# Patient Record
Sex: FEMALE | Race: WHITE | NOT HISPANIC OR LATINO | URBAN - METROPOLITAN AREA
[De-identification: names, ages, dates, MRNs, and addresses within clinical notes are randomized per-mention and may not be internally consistent; named-entity substitution may affect disease eponyms.]

---

## 2018-07-26 ENCOUNTER — TRANSCRIBE ORDERS (OUTPATIENT)
Dept: LAB | Facility: CLINIC | Age: 83
End: 2018-07-26

## 2018-07-26 ENCOUNTER — APPOINTMENT (OUTPATIENT)
Dept: LAB | Facility: CLINIC | Age: 83
End: 2018-07-26
Payer: COMMERCIAL

## 2018-07-26 DIAGNOSIS — I10 ESSENTIAL HYPERTENSION, MALIGNANT: Primary | ICD-10-CM

## 2018-07-26 DIAGNOSIS — I10 ESSENTIAL HYPERTENSION, MALIGNANT: ICD-10-CM

## 2018-07-26 LAB
ALBUMIN SERPL BCP-MCNC: 4.1 G/DL (ref 3.5–5)
ALP SERPL-CCNC: 91 U/L (ref 46–116)
ALT SERPL W P-5'-P-CCNC: 21 U/L (ref 12–78)
ANION GAP SERPL CALCULATED.3IONS-SCNC: 6 MMOL/L (ref 4–13)
AST SERPL W P-5'-P-CCNC: 21 U/L (ref 5–45)
BASOPHILS # BLD AUTO: 0.07 THOUSANDS/ΜL (ref 0–0.1)
BASOPHILS NFR BLD AUTO: 1 % (ref 0–1)
BILIRUB SERPL-MCNC: 0.71 MG/DL (ref 0.2–1)
BUN SERPL-MCNC: 32 MG/DL (ref 5–25)
CALCIUM SERPL-MCNC: 9.2 MG/DL (ref 8.3–10.1)
CHLORIDE SERPL-SCNC: 107 MMOL/L (ref 100–108)
CHOLEST SERPL-MCNC: 216 MG/DL (ref 50–200)
CO2 SERPL-SCNC: 25 MMOL/L (ref 21–32)
CREAT SERPL-MCNC: 1.27 MG/DL (ref 0.6–1.3)
EOSINOPHIL # BLD AUTO: 0.24 THOUSAND/ΜL (ref 0–0.61)
EOSINOPHIL NFR BLD AUTO: 3 % (ref 0–6)
ERYTHROCYTE [DISTWIDTH] IN BLOOD BY AUTOMATED COUNT: 12.5 % (ref 11.6–15.1)
GFR SERPL CREATININE-BSD FRML MDRD: 39 ML/MIN/1.73SQ M
GLUCOSE SERPL-MCNC: 96 MG/DL (ref 65–140)
HCT VFR BLD AUTO: 38.8 % (ref 34.8–46.1)
HDLC SERPL-MCNC: 48 MG/DL (ref 40–60)
HGB BLD-MCNC: 12.7 G/DL (ref 11.5–15.4)
IMM GRANULOCYTES # BLD AUTO: 0.02 THOUSAND/UL (ref 0–0.2)
IMM GRANULOCYTES NFR BLD AUTO: 0 % (ref 0–2)
LDLC SERPL CALC-MCNC: 137 MG/DL (ref 0–100)
LYMPHOCYTES # BLD AUTO: 1.4 THOUSANDS/ΜL (ref 0.6–4.47)
LYMPHOCYTES NFR BLD AUTO: 17 % (ref 14–44)
MCH RBC QN AUTO: 31.5 PG (ref 26.8–34.3)
MCHC RBC AUTO-ENTMCNC: 32.7 G/DL (ref 31.4–37.4)
MCV RBC AUTO: 96 FL (ref 82–98)
MONOCYTES # BLD AUTO: 0.86 THOUSAND/ΜL (ref 0.17–1.22)
MONOCYTES NFR BLD AUTO: 10 % (ref 4–12)
NEUTROPHILS # BLD AUTO: 5.76 THOUSANDS/ΜL (ref 1.85–7.62)
NEUTS SEG NFR BLD AUTO: 69 % (ref 43–75)
NONHDLC SERPL-MCNC: 168 MG/DL
NRBC BLD AUTO-RTO: 0 /100 WBCS
PLATELET # BLD AUTO: 247 THOUSANDS/UL (ref 149–390)
PMV BLD AUTO: 11.4 FL (ref 8.9–12.7)
POTASSIUM SERPL-SCNC: 4.1 MMOL/L (ref 3.5–5.3)
PROT SERPL-MCNC: 7.9 G/DL (ref 6.4–8.2)
RBC # BLD AUTO: 4.03 MILLION/UL (ref 3.81–5.12)
SODIUM SERPL-SCNC: 138 MMOL/L (ref 136–145)
TRIGL SERPL-MCNC: 155 MG/DL
WBC # BLD AUTO: 8.35 THOUSAND/UL (ref 4.31–10.16)

## 2018-07-26 PROCEDURE — 36415 COLL VENOUS BLD VENIPUNCTURE: CPT

## 2018-07-26 PROCEDURE — 80061 LIPID PANEL: CPT

## 2018-07-26 PROCEDURE — 85025 COMPLETE CBC W/AUTO DIFF WBC: CPT

## 2018-07-26 PROCEDURE — 80053 COMPREHEN METABOLIC PANEL: CPT

## 2018-10-22 ENCOUNTER — APPOINTMENT (OUTPATIENT)
Dept: LAB | Facility: CLINIC | Age: 83
End: 2018-10-22
Payer: COMMERCIAL

## 2018-10-22 ENCOUNTER — TRANSCRIBE ORDERS (OUTPATIENT)
Dept: LAB | Facility: CLINIC | Age: 83
End: 2018-10-22

## 2018-10-22 DIAGNOSIS — E78.00 PURE HYPERCHOLESTEROLEMIA: ICD-10-CM

## 2018-10-22 DIAGNOSIS — E78.00 PURE HYPERCHOLESTEROLEMIA: Primary | ICD-10-CM

## 2018-10-22 LAB
ALBUMIN SERPL BCP-MCNC: 4 G/DL (ref 3.5–5)
ALP SERPL-CCNC: 88 U/L (ref 46–116)
ALT SERPL W P-5'-P-CCNC: 21 U/L (ref 12–78)
ANION GAP SERPL CALCULATED.3IONS-SCNC: 5 MMOL/L (ref 4–13)
AST SERPL W P-5'-P-CCNC: 20 U/L (ref 5–45)
BASOPHILS # BLD AUTO: 0.08 THOUSANDS/ΜL (ref 0–0.1)
BASOPHILS NFR BLD AUTO: 1 % (ref 0–1)
BILIRUB SERPL-MCNC: 0.5 MG/DL (ref 0.2–1)
BUN SERPL-MCNC: 27 MG/DL (ref 5–25)
CALCIUM SERPL-MCNC: 9.6 MG/DL (ref 8.3–10.1)
CHLORIDE SERPL-SCNC: 107 MMOL/L (ref 100–108)
CHOLEST SERPL-MCNC: 219 MG/DL (ref 50–200)
CO2 SERPL-SCNC: 27 MMOL/L (ref 21–32)
CREAT SERPL-MCNC: 1.24 MG/DL (ref 0.6–1.3)
EOSINOPHIL # BLD AUTO: 0.48 THOUSAND/ΜL (ref 0–0.61)
EOSINOPHIL NFR BLD AUTO: 6 % (ref 0–6)
ERYTHROCYTE [DISTWIDTH] IN BLOOD BY AUTOMATED COUNT: 12.8 % (ref 11.6–15.1)
GFR SERPL CREATININE-BSD FRML MDRD: 40 ML/MIN/1.73SQ M
GLUCOSE P FAST SERPL-MCNC: 102 MG/DL (ref 65–99)
HCT VFR BLD AUTO: 37.9 % (ref 34.8–46.1)
HDLC SERPL-MCNC: 53 MG/DL (ref 40–60)
HGB BLD-MCNC: 12.1 G/DL (ref 11.5–15.4)
IMM GRANULOCYTES # BLD AUTO: 0.02 THOUSAND/UL (ref 0–0.2)
IMM GRANULOCYTES NFR BLD AUTO: 0 % (ref 0–2)
LDLC SERPL CALC-MCNC: 147 MG/DL (ref 0–100)
LYMPHOCYTES # BLD AUTO: 1.44 THOUSANDS/ΜL (ref 0.6–4.47)
LYMPHOCYTES NFR BLD AUTO: 19 % (ref 14–44)
MCH RBC QN AUTO: 31.7 PG (ref 26.8–34.3)
MCHC RBC AUTO-ENTMCNC: 31.9 G/DL (ref 31.4–37.4)
MCV RBC AUTO: 99 FL (ref 82–98)
MONOCYTES # BLD AUTO: 0.75 THOUSAND/ΜL (ref 0.17–1.22)
MONOCYTES NFR BLD AUTO: 10 % (ref 4–12)
NEUTROPHILS # BLD AUTO: 4.73 THOUSANDS/ΜL (ref 1.85–7.62)
NEUTS SEG NFR BLD AUTO: 64 % (ref 43–75)
NONHDLC SERPL-MCNC: 166 MG/DL
NRBC BLD AUTO-RTO: 0 /100 WBCS
PLATELET # BLD AUTO: 256 THOUSANDS/UL (ref 149–390)
PMV BLD AUTO: 10.8 FL (ref 8.9–12.7)
POTASSIUM SERPL-SCNC: 4.3 MMOL/L (ref 3.5–5.3)
PROT SERPL-MCNC: 7.6 G/DL (ref 6.4–8.2)
RBC # BLD AUTO: 3.82 MILLION/UL (ref 3.81–5.12)
SODIUM SERPL-SCNC: 139 MMOL/L (ref 136–145)
TRIGL SERPL-MCNC: 93 MG/DL
WBC # BLD AUTO: 7.5 THOUSAND/UL (ref 4.31–10.16)

## 2018-10-22 PROCEDURE — 80061 LIPID PANEL: CPT

## 2018-10-22 PROCEDURE — 80053 COMPREHEN METABOLIC PANEL: CPT

## 2018-10-22 PROCEDURE — 85025 COMPLETE CBC W/AUTO DIFF WBC: CPT

## 2018-10-22 PROCEDURE — 36415 COLL VENOUS BLD VENIPUNCTURE: CPT

## 2019-05-07 ENCOUNTER — TRANSCRIBE ORDERS (OUTPATIENT)
Dept: LAB | Facility: CLINIC | Age: 84
End: 2019-05-07

## 2019-05-07 ENCOUNTER — APPOINTMENT (OUTPATIENT)
Dept: LAB | Facility: CLINIC | Age: 84
End: 2019-05-07
Payer: COMMERCIAL

## 2019-05-07 DIAGNOSIS — I10 ESSENTIAL HYPERTENSION, MALIGNANT: ICD-10-CM

## 2019-05-07 DIAGNOSIS — I10 ESSENTIAL HYPERTENSION, MALIGNANT: Primary | ICD-10-CM

## 2019-05-07 LAB
ALBUMIN SERPL BCP-MCNC: 3.9 G/DL (ref 3.5–5)
ALP SERPL-CCNC: 88 U/L (ref 46–116)
ALT SERPL W P-5'-P-CCNC: 19 U/L (ref 12–78)
ANION GAP SERPL CALCULATED.3IONS-SCNC: 5 MMOL/L (ref 4–13)
AST SERPL W P-5'-P-CCNC: 17 U/L (ref 5–45)
BASOPHILS # BLD AUTO: 0.09 THOUSANDS/ΜL (ref 0–0.1)
BASOPHILS NFR BLD AUTO: 1 % (ref 0–1)
BILIRUB SERPL-MCNC: 0.45 MG/DL (ref 0.2–1)
BUN SERPL-MCNC: 23 MG/DL (ref 5–25)
CALCIUM SERPL-MCNC: 9 MG/DL (ref 8.3–10.1)
CHLORIDE SERPL-SCNC: 109 MMOL/L (ref 100–108)
CHOLEST SERPL-MCNC: 196 MG/DL (ref 50–200)
CO2 SERPL-SCNC: 28 MMOL/L (ref 21–32)
CREAT SERPL-MCNC: 1.28 MG/DL (ref 0.6–1.3)
EOSINOPHIL # BLD AUTO: 0.37 THOUSAND/ΜL (ref 0–0.61)
EOSINOPHIL NFR BLD AUTO: 5 % (ref 0–6)
ERYTHROCYTE [DISTWIDTH] IN BLOOD BY AUTOMATED COUNT: 12.5 % (ref 11.6–15.1)
GFR SERPL CREATININE-BSD FRML MDRD: 38 ML/MIN/1.73SQ M
GLUCOSE P FAST SERPL-MCNC: 101 MG/DL (ref 65–99)
HCT VFR BLD AUTO: 38 % (ref 34.8–46.1)
HDLC SERPL-MCNC: 48 MG/DL (ref 40–60)
HGB BLD-MCNC: 12.4 G/DL (ref 11.5–15.4)
IMM GRANULOCYTES # BLD AUTO: 0.03 THOUSAND/UL (ref 0–0.2)
IMM GRANULOCYTES NFR BLD AUTO: 0 % (ref 0–2)
LDLC SERPL CALC-MCNC: 125 MG/DL (ref 0–100)
LYMPHOCYTES # BLD AUTO: 1.72 THOUSANDS/ΜL (ref 0.6–4.47)
LYMPHOCYTES NFR BLD AUTO: 24 % (ref 14–44)
MCH RBC QN AUTO: 31.9 PG (ref 26.8–34.3)
MCHC RBC AUTO-ENTMCNC: 32.6 G/DL (ref 31.4–37.4)
MCV RBC AUTO: 98 FL (ref 82–98)
MONOCYTES # BLD AUTO: 0.65 THOUSAND/ΜL (ref 0.17–1.22)
MONOCYTES NFR BLD AUTO: 9 % (ref 4–12)
NEUTROPHILS # BLD AUTO: 4.37 THOUSANDS/ΜL (ref 1.85–7.62)
NEUTS SEG NFR BLD AUTO: 61 % (ref 43–75)
NONHDLC SERPL-MCNC: 148 MG/DL
NRBC BLD AUTO-RTO: 0 /100 WBCS
PLATELET # BLD AUTO: 215 THOUSANDS/UL (ref 149–390)
PMV BLD AUTO: 11.5 FL (ref 8.9–12.7)
POTASSIUM SERPL-SCNC: 4.4 MMOL/L (ref 3.5–5.3)
PROT SERPL-MCNC: 7.3 G/DL (ref 6.4–8.2)
RBC # BLD AUTO: 3.89 MILLION/UL (ref 3.81–5.12)
SODIUM SERPL-SCNC: 142 MMOL/L (ref 136–145)
TRIGL SERPL-MCNC: 113 MG/DL
WBC # BLD AUTO: 7.23 THOUSAND/UL (ref 4.31–10.16)

## 2019-05-07 PROCEDURE — 80053 COMPREHEN METABOLIC PANEL: CPT

## 2019-05-07 PROCEDURE — 36415 COLL VENOUS BLD VENIPUNCTURE: CPT

## 2019-05-07 PROCEDURE — 80061 LIPID PANEL: CPT

## 2019-05-07 PROCEDURE — 85025 COMPLETE CBC W/AUTO DIFF WBC: CPT

## 2020-07-10 ENCOUNTER — TRANSCRIBE ORDERS (OUTPATIENT)
Dept: LAB | Facility: CLINIC | Age: 85
End: 2020-07-10

## 2020-07-10 ENCOUNTER — APPOINTMENT (OUTPATIENT)
Dept: LAB | Facility: CLINIC | Age: 85
End: 2020-07-10
Payer: COMMERCIAL

## 2020-07-10 DIAGNOSIS — E78.2 MIXED HYPERLIPIDEMIA: ICD-10-CM

## 2020-07-10 DIAGNOSIS — E78.2 MIXED HYPERLIPIDEMIA: Primary | ICD-10-CM

## 2020-07-10 DIAGNOSIS — I10 ESSENTIAL HYPERTENSION, MALIGNANT: ICD-10-CM

## 2020-07-10 LAB
ALBUMIN SERPL BCP-MCNC: 3.8 G/DL (ref 3.5–5)
ALP SERPL-CCNC: 94 U/L (ref 46–116)
ALT SERPL W P-5'-P-CCNC: 21 U/L (ref 12–78)
ANION GAP SERPL CALCULATED.3IONS-SCNC: 3 MMOL/L (ref 4–13)
AST SERPL W P-5'-P-CCNC: 21 U/L (ref 5–45)
BASOPHILS # BLD AUTO: 0.07 THOUSANDS/ΜL (ref 0–0.1)
BASOPHILS NFR BLD AUTO: 1 % (ref 0–1)
BILIRUB SERPL-MCNC: 0.57 MG/DL (ref 0.2–1)
BUN SERPL-MCNC: 30 MG/DL (ref 5–25)
CALCIUM SERPL-MCNC: 9.8 MG/DL (ref 8.3–10.1)
CHLORIDE SERPL-SCNC: 111 MMOL/L (ref 100–108)
CHOLEST SERPL-MCNC: 211 MG/DL (ref 50–200)
CO2 SERPL-SCNC: 27 MMOL/L (ref 21–32)
CREAT SERPL-MCNC: 1.35 MG/DL (ref 0.6–1.3)
EOSINOPHIL # BLD AUTO: 0.31 THOUSAND/ΜL (ref 0–0.61)
EOSINOPHIL NFR BLD AUTO: 4 % (ref 0–6)
ERYTHROCYTE [DISTWIDTH] IN BLOOD BY AUTOMATED COUNT: 12.7 % (ref 11.6–15.1)
GFR SERPL CREATININE-BSD FRML MDRD: 35 ML/MIN/1.73SQ M
GLUCOSE P FAST SERPL-MCNC: 97 MG/DL (ref 65–99)
HCT VFR BLD AUTO: 38.7 % (ref 34.8–46.1)
HDLC SERPL-MCNC: 44 MG/DL
HGB BLD-MCNC: 12.4 G/DL (ref 11.5–15.4)
IMM GRANULOCYTES # BLD AUTO: 0.03 THOUSAND/UL (ref 0–0.2)
IMM GRANULOCYTES NFR BLD AUTO: 0 % (ref 0–2)
LDLC SERPL CALC-MCNC: 144 MG/DL (ref 0–100)
LYMPHOCYTES # BLD AUTO: 1.9 THOUSANDS/ΜL (ref 0.6–4.47)
LYMPHOCYTES NFR BLD AUTO: 27 % (ref 14–44)
MCH RBC QN AUTO: 30.9 PG (ref 26.8–34.3)
MCHC RBC AUTO-ENTMCNC: 32 G/DL (ref 31.4–37.4)
MCV RBC AUTO: 97 FL (ref 82–98)
MONOCYTES # BLD AUTO: 0.68 THOUSAND/ΜL (ref 0.17–1.22)
MONOCYTES NFR BLD AUTO: 10 % (ref 4–12)
NEUTROPHILS # BLD AUTO: 4.15 THOUSANDS/ΜL (ref 1.85–7.62)
NEUTS SEG NFR BLD AUTO: 58 % (ref 43–75)
NONHDLC SERPL-MCNC: 167 MG/DL
NRBC BLD AUTO-RTO: 0 /100 WBCS
PLATELET # BLD AUTO: 226 THOUSANDS/UL (ref 149–390)
PMV BLD AUTO: 11.3 FL (ref 8.9–12.7)
POTASSIUM SERPL-SCNC: 4.3 MMOL/L (ref 3.5–5.3)
PROT SERPL-MCNC: 7.4 G/DL (ref 6.4–8.2)
RBC # BLD AUTO: 4.01 MILLION/UL (ref 3.81–5.12)
SODIUM SERPL-SCNC: 141 MMOL/L (ref 136–145)
TRIGL SERPL-MCNC: 117 MG/DL
WBC # BLD AUTO: 7.14 THOUSAND/UL (ref 4.31–10.16)

## 2020-07-10 PROCEDURE — 36415 COLL VENOUS BLD VENIPUNCTURE: CPT

## 2020-07-10 PROCEDURE — 80053 COMPREHEN METABOLIC PANEL: CPT

## 2020-07-10 PROCEDURE — 85025 COMPLETE CBC W/AUTO DIFF WBC: CPT

## 2020-07-10 PROCEDURE — 80061 LIPID PANEL: CPT

## 2021-03-30 ENCOUNTER — APPOINTMENT (OUTPATIENT)
Dept: LAB | Facility: CLINIC | Age: 86
End: 2021-03-30
Payer: COMMERCIAL

## 2021-03-30 ENCOUNTER — TRANSCRIBE ORDERS (OUTPATIENT)
Dept: LAB | Facility: CLINIC | Age: 86
End: 2021-03-30

## 2021-03-30 DIAGNOSIS — E78.2 MIXED HYPERLIPIDEMIA: ICD-10-CM

## 2021-03-30 DIAGNOSIS — I10 ESSENTIAL HYPERTENSION, MALIGNANT: ICD-10-CM

## 2021-03-30 DIAGNOSIS — I10 ESSENTIAL HYPERTENSION, MALIGNANT: Primary | ICD-10-CM

## 2021-03-30 LAB
ALBUMIN SERPL BCP-MCNC: 3.8 G/DL (ref 3.5–5)
ALP SERPL-CCNC: 91 U/L (ref 46–116)
ALT SERPL W P-5'-P-CCNC: 18 U/L (ref 12–78)
ANION GAP SERPL CALCULATED.3IONS-SCNC: 4 MMOL/L (ref 4–13)
AST SERPL W P-5'-P-CCNC: 17 U/L (ref 5–45)
BASOPHILS # BLD AUTO: 0.06 THOUSANDS/ΜL (ref 0–0.1)
BASOPHILS NFR BLD AUTO: 1 % (ref 0–1)
BILIRUB SERPL-MCNC: 0.56 MG/DL (ref 0.2–1)
BUN SERPL-MCNC: 33 MG/DL (ref 5–25)
CALCIUM SERPL-MCNC: 9.4 MG/DL (ref 8.3–10.1)
CHLORIDE SERPL-SCNC: 109 MMOL/L (ref 100–108)
CHOLEST SERPL-MCNC: 230 MG/DL (ref 50–200)
CO2 SERPL-SCNC: 27 MMOL/L (ref 21–32)
CREAT SERPL-MCNC: 1.33 MG/DL (ref 0.6–1.3)
EOSINOPHIL # BLD AUTO: 0.29 THOUSAND/ΜL (ref 0–0.61)
EOSINOPHIL NFR BLD AUTO: 4 % (ref 0–6)
ERYTHROCYTE [DISTWIDTH] IN BLOOD BY AUTOMATED COUNT: 12.6 % (ref 11.6–15.1)
GFR SERPL CREATININE-BSD FRML MDRD: 36 ML/MIN/1.73SQ M
GLUCOSE P FAST SERPL-MCNC: 109 MG/DL (ref 65–99)
HCT VFR BLD AUTO: 38.4 % (ref 34.8–46.1)
HDLC SERPL-MCNC: 43 MG/DL
HGB BLD-MCNC: 12.3 G/DL (ref 11.5–15.4)
IMM GRANULOCYTES # BLD AUTO: 0.02 THOUSAND/UL (ref 0–0.2)
IMM GRANULOCYTES NFR BLD AUTO: 0 % (ref 0–2)
LDLC SERPL CALC-MCNC: 153 MG/DL (ref 0–100)
LYMPHOCYTES # BLD AUTO: 2.04 THOUSANDS/ΜL (ref 0.6–4.47)
LYMPHOCYTES NFR BLD AUTO: 30 % (ref 14–44)
MCH RBC QN AUTO: 31.6 PG (ref 26.8–34.3)
MCHC RBC AUTO-ENTMCNC: 32 G/DL (ref 31.4–37.4)
MCV RBC AUTO: 99 FL (ref 82–98)
MONOCYTES # BLD AUTO: 0.61 THOUSAND/ΜL (ref 0.17–1.22)
MONOCYTES NFR BLD AUTO: 9 % (ref 4–12)
NEUTROPHILS # BLD AUTO: 3.7 THOUSANDS/ΜL (ref 1.85–7.62)
NEUTS SEG NFR BLD AUTO: 56 % (ref 43–75)
NONHDLC SERPL-MCNC: 187 MG/DL
NRBC BLD AUTO-RTO: 0 /100 WBCS
PLATELET # BLD AUTO: 234 THOUSANDS/UL (ref 149–390)
PMV BLD AUTO: 11.4 FL (ref 8.9–12.7)
POTASSIUM SERPL-SCNC: 4.5 MMOL/L (ref 3.5–5.3)
PROT SERPL-MCNC: 7.5 G/DL (ref 6.4–8.2)
RBC # BLD AUTO: 3.89 MILLION/UL (ref 3.81–5.12)
SODIUM SERPL-SCNC: 140 MMOL/L (ref 136–145)
TRIGL SERPL-MCNC: 171 MG/DL
WBC # BLD AUTO: 6.72 THOUSAND/UL (ref 4.31–10.16)

## 2021-03-30 PROCEDURE — 36415 COLL VENOUS BLD VENIPUNCTURE: CPT

## 2021-03-30 PROCEDURE — 80061 LIPID PANEL: CPT

## 2021-03-30 PROCEDURE — 80053 COMPREHEN METABOLIC PANEL: CPT

## 2021-03-30 PROCEDURE — 85025 COMPLETE CBC W/AUTO DIFF WBC: CPT

## 2021-09-30 ENCOUNTER — APPOINTMENT (OUTPATIENT)
Dept: LAB | Facility: CLINIC | Age: 86
End: 2021-09-30
Payer: COMMERCIAL

## 2021-09-30 DIAGNOSIS — E55.9 AVITAMINOSIS D: ICD-10-CM

## 2021-09-30 DIAGNOSIS — E78.2 MIXED HYPERLIPIDEMIA: ICD-10-CM

## 2021-09-30 DIAGNOSIS — I10 ESSENTIAL HYPERTENSION, MALIGNANT: ICD-10-CM

## 2021-09-30 LAB
25(OH)D3 SERPL-MCNC: 40.4 NG/ML (ref 30–100)
ALBUMIN SERPL BCP-MCNC: 3.6 G/DL (ref 3.5–5)
ALP SERPL-CCNC: 87 U/L (ref 46–116)
ALT SERPL W P-5'-P-CCNC: 19 U/L (ref 12–78)
ANION GAP SERPL CALCULATED.3IONS-SCNC: 2 MMOL/L (ref 4–13)
AST SERPL W P-5'-P-CCNC: 18 U/L (ref 5–45)
BASOPHILS # BLD AUTO: 0.06 THOUSANDS/ΜL (ref 0–0.1)
BASOPHILS NFR BLD AUTO: 1 % (ref 0–1)
BILIRUB SERPL-MCNC: 0.49 MG/DL (ref 0.2–1)
BUN SERPL-MCNC: 30 MG/DL (ref 5–25)
CALCIUM SERPL-MCNC: 9.6 MG/DL (ref 8.3–10.1)
CHLORIDE SERPL-SCNC: 106 MMOL/L (ref 100–108)
CHOLEST SERPL-MCNC: 249 MG/DL (ref 50–200)
CO2 SERPL-SCNC: 28 MMOL/L (ref 21–32)
CREAT SERPL-MCNC: 1.28 MG/DL (ref 0.6–1.3)
EOSINOPHIL # BLD AUTO: 0.21 THOUSAND/ΜL (ref 0–0.61)
EOSINOPHIL NFR BLD AUTO: 3 % (ref 0–6)
ERYTHROCYTE [DISTWIDTH] IN BLOOD BY AUTOMATED COUNT: 12.4 % (ref 11.6–15.1)
GFR SERPL CREATININE-BSD FRML MDRD: 37 ML/MIN/1.73SQ M
GLUCOSE P FAST SERPL-MCNC: 115 MG/DL (ref 65–99)
HCT VFR BLD AUTO: 38.4 % (ref 34.8–46.1)
HDLC SERPL-MCNC: 47 MG/DL
HGB BLD-MCNC: 12.5 G/DL (ref 11.5–15.4)
IMM GRANULOCYTES # BLD AUTO: 0.02 THOUSAND/UL (ref 0–0.2)
IMM GRANULOCYTES NFR BLD AUTO: 0 % (ref 0–2)
LDLC SERPL CALC-MCNC: 165 MG/DL (ref 0–100)
LYMPHOCYTES # BLD AUTO: 1.7 THOUSANDS/ΜL (ref 0.6–4.47)
LYMPHOCYTES NFR BLD AUTO: 25 % (ref 14–44)
MCH RBC QN AUTO: 32.4 PG (ref 26.8–34.3)
MCHC RBC AUTO-ENTMCNC: 32.6 G/DL (ref 31.4–37.4)
MCV RBC AUTO: 100 FL (ref 82–98)
MONOCYTES # BLD AUTO: 0.67 THOUSAND/ΜL (ref 0.17–1.22)
MONOCYTES NFR BLD AUTO: 10 % (ref 4–12)
NEUTROPHILS # BLD AUTO: 4.1 THOUSANDS/ΜL (ref 1.85–7.62)
NEUTS SEG NFR BLD AUTO: 61 % (ref 43–75)
NONHDLC SERPL-MCNC: 202 MG/DL
NRBC BLD AUTO-RTO: 0 /100 WBCS
PLATELET # BLD AUTO: 243 THOUSANDS/UL (ref 149–390)
PMV BLD AUTO: 10.9 FL (ref 8.9–12.7)
POTASSIUM SERPL-SCNC: 4 MMOL/L (ref 3.5–5.3)
PROT SERPL-MCNC: 7.6 G/DL (ref 6.4–8.2)
RBC # BLD AUTO: 3.86 MILLION/UL (ref 3.81–5.12)
SODIUM SERPL-SCNC: 136 MMOL/L (ref 136–145)
TRIGL SERPL-MCNC: 183 MG/DL
WBC # BLD AUTO: 6.76 THOUSAND/UL (ref 4.31–10.16)

## 2021-09-30 PROCEDURE — 82306 VITAMIN D 25 HYDROXY: CPT

## 2021-09-30 PROCEDURE — 80061 LIPID PANEL: CPT

## 2021-09-30 PROCEDURE — 85025 COMPLETE CBC W/AUTO DIFF WBC: CPT

## 2021-09-30 PROCEDURE — 80053 COMPREHEN METABOLIC PANEL: CPT

## 2021-09-30 PROCEDURE — 36415 COLL VENOUS BLD VENIPUNCTURE: CPT

## 2022-07-20 ENCOUNTER — OFFICE VISIT (OUTPATIENT)
Dept: URGENT CARE | Facility: CLINIC | Age: 87
End: 2022-07-20
Payer: COMMERCIAL

## 2022-07-20 VITALS — WEIGHT: 135 LBS | TEMPERATURE: 99 F | OXYGEN SATURATION: 98 % | HEART RATE: 76 BPM | RESPIRATION RATE: 18 BRPM

## 2022-07-20 DIAGNOSIS — R22.31 LUMP OF SKIN OF UPPER EXTREMITY, RIGHT: Primary | ICD-10-CM

## 2022-07-20 PROCEDURE — 99203 OFFICE O/P NEW LOW 30 MIN: CPT | Performed by: PHYSICIAN ASSISTANT

## 2022-07-20 RX ORDER — HYDROCHLOROTHIAZIDE 12.5 MG/1
12.5 TABLET ORAL DAILY
COMMUNITY
Start: 2022-05-06

## 2022-07-20 RX ORDER — LISINOPRIL 20 MG/1
20 TABLET ORAL 2 TIMES DAILY
COMMUNITY
Start: 2022-07-11

## 2022-07-20 NOTE — PROGRESS NOTES
[facility]        NAME: Fariha Hylton is a 80 y o  female  : 1933    MRN: [id number]  DATE: 2022  TIME: 3:34 PM    Assessment and Plan   Lump of skin of upper extremity, right [R22 31]  1  Lump of skin of upper extremity, right       - Advised patient to follow up with PCP or derm     Patient Instructions       Follow up with PCP in 3-5 days  Proceed to  ER if symptoms worsen  Chief Complaint     Chief Complaint   Patient presents with    Hand Injury     Pt reports injuring right hand a couple of weeks ago  C/o pain, and a hard non healing lump  History of Present Illness       Patient is an 59-year-old female who presents with a CC of bump on right hand x a couple of weeks ago  She reports that she first noticed the bump after possibly hitting her hand against something  States it is minimally tender to the touch  Denies any new numbness, weakness, tingling, reduced ROM of the wrist/hand/fingers, fevers  Review of Systems   Review of Systems   Constitutional: Negative for fever  Musculoskeletal: Positive for arthralgias  Negative for joint swelling  Right hand pain   Skin: Negative for color change  Neurological: Positive for numbness  Negative for weakness          Chronic hand numbness/tingling         Current Medications       Current Outpatient Medications:     Ascorbic Acid, Vitamin C, (VITAMIN C) 100 MG tablet, Take 1 tablet by mouth in the morning, Disp: , Rfl:     hydrochlorothiazide (HYDRODIURIL) 12 5 mg tablet, Take 12 5 mg by mouth in the morning, Disp: , Rfl:     lisinopril (ZESTRIL) 20 mg tablet, Take 20 mg by mouth 2 (two) times a day, Disp: , Rfl:     Current Allergies     Allergies as of 2022    (No Known Allergies)            The following portions of the patient's history were reviewed and updated as appropriate: allergies, current medications, past family history, past medical history, past social history, past surgical history and problem list      Past Medical History:   Diagnosis Date    Hypertension        Past Surgical History:   Procedure Laterality Date    APPENDECTOMY      CYST REMOVAL Right        History reviewed  No pertinent family history  Medications have been verified  Objective   Pulse 76   Temp 99 °F (37 2 °C) (Tympanic)   Resp 18   Wt 61 2 kg (135 lb)   SpO2 98%        Physical Exam     Physical Exam  Constitutional:       General: She is not in acute distress  Appearance: She is not toxic-appearing  Cardiovascular:      Rate and Rhythm: Normal rate  Pulmonary:      Effort: Pulmonary effort is normal    Musculoskeletal:      Comments: 1 cm minimally tender fluid bump noted below skin on dorsal surface of right hand  No central fluctuance  No erythema or warmth    Skin:     General: Skin is warm and dry  Findings: Rash: lump on hand  Neurological:      Mental Status: She is alert     Psychiatric:         Mood and Affect: Mood normal          Behavior: Behavior normal

## 2025-03-04 ENCOUNTER — HOSPITAL ENCOUNTER (INPATIENT)
Facility: HOSPITAL | Age: OVER 89
LOS: 6 days | Discharge: NURSING, REHAB OR LONG-TERM CARE | DRG: 356 | End: 2025-03-10
Attending: STUDENT IN AN ORGANIZED HEALTH CARE EDUCATION/TRAINING PROGRAM | Admitting: FAMILY MEDICINE
Payer: MEDICARE

## 2025-03-04 DIAGNOSIS — I63.9 CEREBROVASCULAR ACCIDENT (CVA), UNSPECIFIED MECHANISM (HCC): ICD-10-CM

## 2025-03-04 DIAGNOSIS — N39.0 COMPLICATED UTI (URINARY TRACT INFECTION): ICD-10-CM

## 2025-03-04 DIAGNOSIS — K92.2 UPPER GI BLEED: Primary | ICD-10-CM

## 2025-03-04 DIAGNOSIS — E43 SEVERE PROTEIN-CALORIE MALNUTRITION (HCC): ICD-10-CM

## 2025-03-04 DIAGNOSIS — R13.12 OROPHARYNGEAL DYSPHAGIA: ICD-10-CM

## 2025-03-04 DIAGNOSIS — R06.2 WHEEZING: ICD-10-CM

## 2025-03-04 DIAGNOSIS — I10 HTN (HYPERTENSION): ICD-10-CM

## 2025-03-04 DIAGNOSIS — L98.429 SACRAL ULCER (HCC): ICD-10-CM

## 2025-03-05 ENCOUNTER — APPOINTMENT (INPATIENT)
Dept: RADIOLOGY | Facility: HOSPITAL | Age: OVER 89
DRG: 356 | End: 2025-03-05
Payer: MEDICARE

## 2025-03-05 ENCOUNTER — TELEPHONE (OUTPATIENT)
Age: OVER 89
End: 2025-03-05

## 2025-03-05 ENCOUNTER — APPOINTMENT (INPATIENT)
Dept: CT IMAGING | Facility: HOSPITAL | Age: OVER 89
DRG: 356 | End: 2025-03-05
Payer: MEDICARE

## 2025-03-05 PROBLEM — E43 SEVERE PROTEIN-CALORIE MALNUTRITION (HCC): Status: ACTIVE | Noted: 2025-03-05

## 2025-03-05 PROBLEM — L98.429 SACRAL ULCER (HCC): Status: ACTIVE | Noted: 2025-03-05

## 2025-03-05 PROBLEM — D62 ACUTE POST-HEMORRHAGIC ANEMIA: Status: ACTIVE | Noted: 2025-03-05

## 2025-03-05 PROBLEM — I63.9 CVA (CEREBRAL VASCULAR ACCIDENT) (HCC): Status: ACTIVE | Noted: 2025-03-05

## 2025-03-05 PROBLEM — R13.12 OROPHARYNGEAL DYSPHAGIA: Status: ACTIVE | Noted: 2025-03-05

## 2025-03-05 PROBLEM — K92.2 UPPER GI BLEED: Status: ACTIVE | Noted: 2025-03-05

## 2025-03-05 LAB
ALBUMIN SERPL BCG-MCNC: 2.9 G/DL (ref 3.5–5)
ALP SERPL-CCNC: 63 U/L (ref 34–104)
ALT SERPL W P-5'-P-CCNC: 16 U/L (ref 7–52)
ANION GAP SERPL CALCULATED.3IONS-SCNC: 6 MMOL/L (ref 4–13)
APTT PPP: 40 SECONDS (ref 23–34)
AST SERPL W P-5'-P-CCNC: 13 U/L (ref 13–39)
BACTERIA UR QL AUTO: ABNORMAL /HPF
BILIRUB SERPL-MCNC: 0.46 MG/DL (ref 0.2–1)
BILIRUB UR QL STRIP: NEGATIVE
BUDDING YEAST: PRESENT
BUN SERPL-MCNC: 30 MG/DL (ref 5–25)
CALCIUM ALBUM COR SERPL-MCNC: 9.5 MG/DL (ref 8.3–10.1)
CALCIUM SERPL-MCNC: 8.6 MG/DL (ref 8.4–10.2)
CHLORIDE SERPL-SCNC: 101 MMOL/L (ref 96–108)
CLARITY UR: ABNORMAL
CO2 SERPL-SCNC: 28 MMOL/L (ref 21–32)
COLOR UR: YELLOW
CREAT SERPL-MCNC: 0.94 MG/DL (ref 0.6–1.3)
GFR SERPL CREATININE-BSD FRML MDRD: 53 ML/MIN/1.73SQ M
GLUCOSE SERPL-MCNC: 112 MG/DL (ref 65–140)
GLUCOSE SERPL-MCNC: 115 MG/DL (ref 65–140)
GLUCOSE SERPL-MCNC: 117 MG/DL (ref 65–140)
GLUCOSE SERPL-MCNC: 131 MG/DL (ref 65–140)
GLUCOSE SERPL-MCNC: 134 MG/DL (ref 65–140)
GLUCOSE SERPL-MCNC: 144 MG/DL (ref 65–140)
GLUCOSE SERPL-MCNC: 153 MG/DL (ref 65–140)
GLUCOSE UR STRIP-MCNC: NEGATIVE MG/DL
HGB BLD-MCNC: 6.9 G/DL (ref 11.5–15.4)
HGB BLD-MCNC: 7.3 G/DL (ref 11.5–15.4)
HGB BLD-MCNC: 7.3 G/DL (ref 11.5–15.4)
HGB UR QL STRIP.AUTO: NEGATIVE
INR PPP: 1.19 (ref 0.85–1.19)
KETONES UR STRIP-MCNC: ABNORMAL MG/DL
LEUKOCYTE ESTERASE UR QL STRIP: ABNORMAL
MAGNESIUM SERPL-MCNC: 1.8 MG/DL (ref 1.9–2.7)
NITRITE UR QL STRIP: NEGATIVE
NON-SQ EPI CELLS URNS QL MICRO: ABNORMAL /HPF
PH UR STRIP.AUTO: 6 [PH]
PHOSPHATE SERPL-MCNC: 4.3 MG/DL (ref 2.3–4.1)
POTASSIUM SERPL-SCNC: 4.6 MMOL/L (ref 3.5–5.3)
PROT SERPL-MCNC: 5.9 G/DL (ref 6.4–8.4)
PROT UR STRIP-MCNC: ABNORMAL MG/DL
PROTHROMBIN TIME: 15.8 SECONDS (ref 12.3–15)
RBC #/AREA URNS AUTO: ABNORMAL /HPF
SODIUM SERPL-SCNC: 135 MMOL/L (ref 135–147)
SP GR UR STRIP.AUTO: >=1.05 (ref 1–1.03)
UROBILINOGEN UR STRIP-ACNC: <2 MG/DL
WBC #/AREA URNS AUTO: ABNORMAL /HPF

## 2025-03-05 PROCEDURE — 87086 URINE CULTURE/COLONY COUNT: CPT

## 2025-03-05 PROCEDURE — 97167 OT EVAL HIGH COMPLEX 60 MIN: CPT

## 2025-03-05 PROCEDURE — 0JB70ZZ EXCISION OF BACK SUBCUTANEOUS TISSUE AND FASCIA, OPEN APPROACH: ICD-10-PCS | Performed by: STUDENT IN AN ORGANIZED HEALTH CARE EDUCATION/TRAINING PROGRAM

## 2025-03-05 PROCEDURE — 85610 PROTHROMBIN TIME: CPT | Performed by: STUDENT IN AN ORGANIZED HEALTH CARE EDUCATION/TRAINING PROGRAM

## 2025-03-05 PROCEDURE — 87077 CULTURE AEROBIC IDENTIFY: CPT

## 2025-03-05 PROCEDURE — 87081 CULTURE SCREEN ONLY: CPT

## 2025-03-05 PROCEDURE — 94760 N-INVAS EAR/PLS OXIMETRY 1: CPT

## 2025-03-05 PROCEDURE — 70450 CT HEAD/BRAIN W/O DYE: CPT

## 2025-03-05 PROCEDURE — 71045 X-RAY EXAM CHEST 1 VIEW: CPT

## 2025-03-05 PROCEDURE — 97598 DBRDMT OPN WND ADDL 20CM/<: CPT | Performed by: PHYSICIAN ASSISTANT

## 2025-03-05 PROCEDURE — 87186 SC STD MICRODIL/AGAR DIL: CPT

## 2025-03-05 PROCEDURE — 81001 URINALYSIS AUTO W/SCOPE: CPT

## 2025-03-05 PROCEDURE — 94664 DEMO&/EVAL PT USE INHALER: CPT

## 2025-03-05 PROCEDURE — 85730 THROMBOPLASTIN TIME PARTIAL: CPT | Performed by: STUDENT IN AN ORGANIZED HEALTH CARE EDUCATION/TRAINING PROGRAM

## 2025-03-05 PROCEDURE — 85018 HEMOGLOBIN: CPT

## 2025-03-05 PROCEDURE — 99223 1ST HOSP IP/OBS HIGH 75: CPT | Performed by: FAMILY MEDICINE

## 2025-03-05 PROCEDURE — 82948 REAGENT STRIP/BLOOD GLUCOSE: CPT

## 2025-03-05 PROCEDURE — 83735 ASSAY OF MAGNESIUM: CPT | Performed by: STUDENT IN AN ORGANIZED HEALTH CARE EDUCATION/TRAINING PROGRAM

## 2025-03-05 PROCEDURE — 99448 NTRPROF PH1/NTRNET/EHR 21-30: CPT | Performed by: NURSE PRACTITIONER

## 2025-03-05 PROCEDURE — 84100 ASSAY OF PHOSPHORUS: CPT | Performed by: STUDENT IN AN ORGANIZED HEALTH CARE EDUCATION/TRAINING PROGRAM

## 2025-03-05 PROCEDURE — 99222 1ST HOSP IP/OBS MODERATE 55: CPT | Performed by: PHYSICIAN ASSISTANT

## 2025-03-05 PROCEDURE — 92610 EVALUATE SWALLOWING FUNCTION: CPT

## 2025-03-05 PROCEDURE — 99222 1ST HOSP IP/OBS MODERATE 55: CPT | Performed by: INTERNAL MEDICINE

## 2025-03-05 PROCEDURE — 80053 COMPREHEN METABOLIC PANEL: CPT | Performed by: STUDENT IN AN ORGANIZED HEALTH CARE EDUCATION/TRAINING PROGRAM

## 2025-03-05 PROCEDURE — 97597 DBRDMT OPN WND 1ST 20 CM/<: CPT | Performed by: PHYSICIAN ASSISTANT

## 2025-03-05 PROCEDURE — 97163 PT EVAL HIGH COMPLEX 45 MIN: CPT

## 2025-03-05 RX ORDER — LEVETIRACETAM 100 MG/ML
500 SOLUTION ORAL
Status: DISCONTINUED | OUTPATIENT
Start: 2025-03-05 | End: 2025-03-05

## 2025-03-05 RX ORDER — INSULIN LISPRO 100 [IU]/ML
1-5 INJECTION, SOLUTION INTRAVENOUS; SUBCUTANEOUS EVERY 6 HOURS SCHEDULED
Status: DISCONTINUED | OUTPATIENT
Start: 2025-03-05 | End: 2025-03-05

## 2025-03-05 RX ORDER — OXCARBAZEPINE 60 MG/ML
150 SUSPENSION ORAL 2 TIMES DAILY
Status: DISCONTINUED | OUTPATIENT
Start: 2025-03-05 | End: 2025-03-08

## 2025-03-05 RX ORDER — ACETAMINOPHEN 10 MG/ML
1000 INJECTION, SOLUTION INTRAVENOUS EVERY 8 HOURS PRN
Status: DISCONTINUED | OUTPATIENT
Start: 2025-03-05 | End: 2025-03-06

## 2025-03-05 RX ORDER — ACETAMINOPHEN 160 MG/5ML
650 SUSPENSION ORAL EVERY 6 HOURS PRN
Status: DISCONTINUED | OUTPATIENT
Start: 2025-03-05 | End: 2025-03-05

## 2025-03-05 RX ORDER — METRONIDAZOLE 500 MG/100ML
500 INJECTION, SOLUTION INTRAVENOUS EVERY 8 HOURS
Status: DISCONTINUED | OUTPATIENT
Start: 2025-03-05 | End: 2025-03-08

## 2025-03-05 RX ORDER — INSULIN LISPRO 100 [IU]/ML
1-5 INJECTION, SOLUTION INTRAVENOUS; SUBCUTANEOUS EVERY 6 HOURS SCHEDULED
Status: DISCONTINUED | OUTPATIENT
Start: 2025-03-05 | End: 2025-03-10 | Stop reason: HOSPADM

## 2025-03-05 RX ORDER — ALBUTEROL SULFATE 90 UG/1
2 INHALANT RESPIRATORY (INHALATION) EVERY 4 HOURS PRN
Status: DISCONTINUED | OUTPATIENT
Start: 2025-03-05 | End: 2025-03-10 | Stop reason: HOSPADM

## 2025-03-05 RX ORDER — LEVETIRACETAM 100 MG/ML
250 SOLUTION ORAL
Status: DISCONTINUED | OUTPATIENT
Start: 2025-03-05 | End: 2025-03-05

## 2025-03-05 RX ORDER — SODIUM CHLORIDE, SODIUM GLUCONATE, SODIUM ACETATE, POTASSIUM CHLORIDE, MAGNESIUM CHLORIDE, SODIUM PHOSPHATE, DIBASIC, AND POTASSIUM PHOSPHATE .53; .5; .37; .037; .03; .012; .00082 G/100ML; G/100ML; G/100ML; G/100ML; G/100ML; G/100ML; G/100ML
75 INJECTION, SOLUTION INTRAVENOUS CONTINUOUS
Status: DISCONTINUED | OUTPATIENT
Start: 2025-03-05 | End: 2025-03-10 | Stop reason: HOSPADM

## 2025-03-05 RX ORDER — VANCOMYCIN HYDROCHLORIDE 1 G/200ML
1000 INJECTION, SOLUTION INTRAVENOUS EVERY 24 HOURS
Status: DISCONTINUED | OUTPATIENT
Start: 2025-03-05 | End: 2025-03-07

## 2025-03-05 RX ORDER — PANTOPRAZOLE SODIUM 40 MG/10ML
40 INJECTION, POWDER, LYOPHILIZED, FOR SOLUTION INTRAVENOUS EVERY 12 HOURS
Status: DISCONTINUED | OUTPATIENT
Start: 2025-03-05 | End: 2025-03-10 | Stop reason: HOSPADM

## 2025-03-05 RX ORDER — LEVETIRACETAM 500 MG/5ML
250 INJECTION, SOLUTION, CONCENTRATE INTRAVENOUS
Status: DISCONTINUED | OUTPATIENT
Start: 2025-03-06 | End: 2025-03-08

## 2025-03-05 RX ORDER — LEVETIRACETAM 500 MG/5ML
500 INJECTION, SOLUTION, CONCENTRATE INTRAVENOUS
Status: DISCONTINUED | OUTPATIENT
Start: 2025-03-05 | End: 2025-03-08

## 2025-03-05 RX ORDER — AMLODIPINE BESYLATE 10 MG/1
10 TABLET ORAL DAILY
Status: DISCONTINUED | OUTPATIENT
Start: 2025-03-05 | End: 2025-03-10 | Stop reason: HOSPADM

## 2025-03-05 RX ADMIN — VANCOMYCIN HYDROCHLORIDE 1000 MG: 1 INJECTION, SOLUTION INTRAVENOUS at 04:49

## 2025-03-05 RX ADMIN — CEFEPIME 2000 MG: 2 INJECTION, POWDER, FOR SOLUTION INTRAVENOUS at 01:56

## 2025-03-05 RX ADMIN — PANTOPRAZOLE SODIUM 40 MG: 40 INJECTION, POWDER, FOR SOLUTION INTRAVENOUS at 04:16

## 2025-03-05 RX ADMIN — METRONIDAZOLE 500 MG: 500 INJECTION, SOLUTION INTRAVENOUS at 21:02

## 2025-03-05 RX ADMIN — COLLAGENASE SANTYL: 250 OINTMENT TOPICAL at 13:37

## 2025-03-05 RX ADMIN — METRONIDAZOLE 500 MG: 500 INJECTION, SOLUTION INTRAVENOUS at 10:41

## 2025-03-05 RX ADMIN — SODIUM CHLORIDE, SODIUM GLUCONATE, SODIUM ACETATE, POTASSIUM CHLORIDE, MAGNESIUM CHLORIDE, SODIUM PHOSPHATE, DIBASIC, AND POTASSIUM PHOSPHATE 75 ML/HR: .53; .5; .37; .037; .03; .012; .00082 INJECTION, SOLUTION INTRAVENOUS at 21:02

## 2025-03-05 RX ADMIN — ACETAMINOPHEN 1000 MG: 10 INJECTION INTRAVENOUS at 03:21

## 2025-03-05 RX ADMIN — METRONIDAZOLE 500 MG: 500 INJECTION, SOLUTION INTRAVENOUS at 04:12

## 2025-03-05 RX ADMIN — SODIUM CHLORIDE, SODIUM GLUCONATE, SODIUM ACETATE, POTASSIUM CHLORIDE, MAGNESIUM CHLORIDE, SODIUM PHOSPHATE, DIBASIC, AND POTASSIUM PHOSPHATE 75 ML/HR: .53; .5; .37; .037; .03; .012; .00082 INJECTION, SOLUTION INTRAVENOUS at 01:56

## 2025-03-05 RX ADMIN — CEFEPIME 2000 MG: 2 INJECTION, POWDER, FOR SOLUTION INTRAVENOUS at 13:37

## 2025-03-05 RX ADMIN — ACETAMINOPHEN 1000 MG: 10 INJECTION INTRAVENOUS at 23:32

## 2025-03-05 RX ADMIN — PANTOPRAZOLE SODIUM 40 MG: 40 INJECTION, POWDER, FOR SOLUTION INTRAVENOUS at 13:37

## 2025-03-05 NOTE — WOUND OSTOMY CARE
Consult Note - Wound   Hermila Montes 89 y.o. female MRN: [id number]  Unit/Bed#: -01 Encounter: [id number]        History and Present Illness:  Patient is a 89 yo female that was admitted to [facility] for treatment of upper GI bleed.  Patient has a PMH of  CVA, sacral ulcer, oropharyngeal dysphagia, bedbound, pneumonia, respiratory failure, chronic Ball and PEG tube. Patient is a maximum assist for turning and repositioning. Patient is incontinent of bowel and has a ball catheter for bladder management. On assessment, patient is lying in bed on a standard mattress. Nutrition is via PEG tube.     Wound Care was consulted for sacral wound and b/l heel wounds, present on admission.     Assessment Findings:   B/l hips and elbows skin dry, intact and blanchable. No wounds or drainage noted at time of exam.     Unstageable POA Pressure Injury Sacrum: Wound bed is irregular in shape black, eschar, yellow slough tissue full thickness skin loss with small serosanguineous drainage. Nika-wound is pink intact skin. This wound has the potential to evolve into a full thickness pressure injury Stage 3 or 4. Recommend referral for surgical consultation.   Unstageable POA Pressure Injury Right heel: Wound bed is oval in shape, black/brown eschar, tissue with no drainage noted at time of exam. True depth of wound unknown at this time. Nika-wound is pink/brown dry intact skin. No drainage noted at time of exam. This wound has the potential to evolve into a full thickness pressure injury Stage 3 or 4. Recommend silicone foam dressing.   Unstageable POA Pressure Injury Left heel: Wound bed is oval in shape, black/brown eschar tissue with no drainage noted at time of exam. True depth of wound unknown at this time. Nika-wound is pink dry intact skin. No drainage noted at time of exam. This wound has the potential to evolve into a full thickness pressure injury Stage 3 or 4. Recommend silicone foam dressing.       No induration,  fluctuance, odor, warmth/temperature differences, redness, or purulence noted to the above noted wounds and skin areas assessed. New dressings applied per orders listed below. Patient non verbal and constantly crying out, unclear if due to pain in wound or if it is her typical behavior. Bedside nurse aware of plan of care. See flow sheets for more detailed assessment findings.      Orders listed below and wound care will continue to follow, call or Secure Chat Message with questions.     Skin Care Plan:  1-B/L heels: cleanse gently with soap and water, pat dry gently. Apply silicone foam dressing to wound, arpan with T for treatment, change every other day and PRN if soiled or displaced. Peel back dressing to inspect skin every shift.   2-Sacrum: Cleanse with NSS, pat dry gently. Apply Santyl to wound bed, cover with dry 4x4 (times 3) and cover with silicone foam dressing (Mepilex). Change daily and PRN if soiled or displaced.   2-Turn/reposition q2h or when medically stable for pressure re-distribution on skin, utilizing ATR turning and repositioning system.   3-Elevate heels to offload pressure, utilize offloading boots  4-Moisturize skin daily with skin nourishing cream  5-Ehob cushion in chair when out of bed.        Wounds:  Wound 03/05/25 Sacrum (Active)   Wound Image   03/05/25 0945   Wound Description Black;Slough;Yellow;Eschar;Brown;Pink 03/05/25 0945   Nika-wound Assessment Pink 03/05/25 0945   Wound Length (cm) 12.5 cm 03/05/25 0945   Wound Width (cm) 10.5 cm 03/05/25 0945   Wound Depth (cm) 2.1 cm 03/05/25 0945   Wound Surface Area (cm^2) 131.25 cm^2 03/05/25 0945   Wound Volume (cm^3) 275.625 cm^3 03/05/25 0945   Calculated Wound Volume (cm^3) 275.63 cm^3 03/05/25 0945   Number of underminings 1 03/05/25 0945   Undermining 1 3.5 03/05/25 0945   Undermining 1 is depth extending from 10-11 03/05/25 0945   Drainage Amount Small 03/05/25 0945   Drainage Description Serosanguineous 03/05/25 0945   Non-staged  Wound Description Full thickness 03/05/25 0945   Dressing Changed New 03/05/25 0945   Patient Tolerance Tolerated well 03/05/25 0945   Dressing Status Dry;Intact;Clean 03/05/25 0945       Wound 03/05/25 Heel Right (Active)   Wound Image   03/05/25 0953   Wound Description Black;Eschar;Light purple 03/05/25 0953   Nika-wound Assessment Dry;Intact;Brown;Pink 03/05/25 0953   Wound Length (cm) 3.2 cm 03/05/25 0953   Wound Width (cm) 5.2 cm 03/05/25 0953   Wound Depth (cm) 0 cm 03/05/25 0953   Wound Surface Area (cm^2) 16.64 cm^2 03/05/25 0953   Wound Volume (cm^3) 0 cm^3 03/05/25 0953   Calculated Wound Volume (cm^3) 0 cm^3 03/05/25 0953   Drainage Amount None 03/05/25 0953   Non-staged Wound Description Full thickness 03/05/25 0953   Treatments Cleansed;Site care 03/05/25 0953   Dressing Foam, Silicon (eg. Allevyn, etc) 03/05/25 0953   Patient Tolerance Tolerated well 03/05/25 0953   Dressing Status Dry;Intact 03/05/25 0953       Wound 03/05/25 Heel Left (Active)   Wound Image   03/05/25 0954   Wound Description Eschar;Black;Brown;Pink 03/05/25 0954   Nika-wound Assessment Dry;Intact;Pink 03/05/25 0954   Wound Length (cm) 1.5 cm 03/05/25 0954   Wound Width (cm) 1.5 cm 03/05/25 0954   Wound Depth (cm) 0 cm 03/05/25 0954   Wound Surface Area (cm^2) 2.25 cm^2 03/05/25 0954   Wound Volume (cm^3) 0 cm^3 03/05/25 0954   Calculated Wound Volume (cm^3) 0 cm^3 03/05/25 0954   Drainage Amount None 03/05/25 0954   Non-staged Wound Description Full thickness 03/05/25 0954   Treatments Cleansed;Site care 03/05/25 0954   Dressing Foam, Silicon (eg. Allevyn, etc) 03/05/25 0954   Patient Tolerance Tolerated well 03/05/25 0954   Dressing Status Dry;Intact 03/05/25 0954         Patient seen and assessed with Migdalia Gordillo, RN, BSN, CWON.  Elizabeth De La Rosa RN, BSN

## 2025-03-05 NOTE — ASSESSMENT & PLAN NOTE
Patient presented to the [facility] ED on 3/4/2025 from [facility] due to complaints of coffee-ground emesis x 1 during the a.m. patient also had a large sacral ulcer with temp of 100.2 F at the nursing home.   Patient transferred per family's request from [facility] to [facility].  Report received from [facility] provider Dr. CAMPBELL was consulted at [facility]-with plan of supportive care with no immediate plans for endoscopy.    Plan  Keep patient n.p.o.  Serial hemoglobin hematocrit every 8 hours.  Stool records and monitor for bleeding.  Protonix IV.  GI consult should be considered if patient becomes dynamically unstable.

## 2025-03-05 NOTE — ASSESSMENT & PLAN NOTE
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  Daughter-in-law says patient has been doing physical therapy in which she has improved with movement. Patient is currently bedbound, with flaccid left arm. Patient also has expressive aphasia, daughter-in-law at 1 point patient was nonverbal but is now able to say a few words here and there.  Turn and reposition.  Specialty bed ordered.  PT and OT evaluation and treat.

## 2025-03-05 NOTE — PLAN OF CARE
Recommendations:   Diet: NPO with tube feeds   Meds: non-oral if possible   Frequent Oral care: 2-4x/day  Aspiration precautions and compensatory swallowing strategies: upright posture and oral care   Other Recommendations/ considerations: SLP will continue to follow for ongoing evaluation to determine safety of oral diet x1-3 as pt status permits

## 2025-03-05 NOTE — OCCUPATIONAL THERAPY NOTE
Occupational Therapy Evaluation         Patient Name: Hermila Montes  Today's Date: 3/5/2025         03/05/25 0921   OT Last Visit   OT Visit Date 03/05/25   Note Type   Note type Evaluation   Pain Assessment   Pain Assessment Tool FLACC   Pain Rating: FLACC (Rest) - Face 1   Pain Rating: FLACC (Rest) - Legs 0   Pain Rating: FLACC (Rest) - Activity 0   Pain Rating: FLACC (Rest) - Cry 0   Pain Rating: FLACC (Rest) - Consolability 0   Score: FLACC (Rest) 1   Pain Rating: FLACC (Activity) - Face 1   Pain Rating: FLACC (Activity) - Legs 0   Pain Rating: FLACC (Activity) - Activity 1   Pain Rating: FLACC (Activity) - Cry 0   Pain Rating: FLACC (Activity) - Consolability 0   Score: FLACC (Activity) 2   Restrictions/Precautions   Weight Bearing Precautions Per Order No   Other Precautions Cognitive;Bed Alarm;Fall Risk;Pain  (sacral wound)   Home Living   Type of Home SNF  ([facility])   Prior Function   Lives With Facility staff   Receives Help From Other (Comment)  (staff at facility)   Comments Pt poor historian d/t cognitive impairments and prior CVA. Pt unable to provide PLOF and home set up information. CM to follow up.   Lifestyle   Autonomy Pt presents from [facility] SNF where she has been since hospitalization for subarachnoid hemorrhage in January 2025 at [facility]. Pt requires with assistance for all ADLs and IADLs from facility staff.   General   Family/Caregiver Present No   ADL   Where Assessed Supine, bed  (ADL levels based on functional performance during OT evaluation.)   Eating Assistance 1  Total Assistance   Grooming Assistance 2  Maximal Assistance   Grooming Deficit Setup;Verbal cueing;Supervision/safety;Increased time to complete   UB Bathing Assistance 1  Total Assistance   UB Bathing Deficit Setup;Verbal cueing;Supervision/safety;Increased time to complete   LB Bathing Assistance 1  Total Assistance   LB Bathing Deficit Setup;Verbal cueing;Supervision/safety;Increased time to complete   UB  Dressing Assistance 2  Maximal Assistance   UB Dressing Deficit Setup;Verbal cueing;Supervision/safety;Increased time to complete;Thread RUE;Thread LUE   LB Dressing Assistance 1  Total Assistance   LB Dressing Deficit Setup;Verbal cueing;Supervision/safety;Increased time to complete;Don/doff R sock;Don/doff L sock   Toileting Assistance  1  Total Assistance   Toileting Deficit   (+ball cath)   Functional Assistance 2  Maximal Assistance   Bed Mobility   Rolling R 2  Maximal assistance   Additional items Assist x 2;HOB elevated;Increased time required;Verbal cues;LE management   Rolling L 2  Maximal assistance   Additional items Assist x 2;HOB elevated;Increased time required;Verbal cues;LE management   Additional Comments Further mobility deferred at this time.   Activity Tolerance   Activity Tolerance Patient limited by fatigue;Treatment limited secondary to medical complications (Comment)   Medical Staff Made Aware Pt seen as a co-eval with PT due to the patient's co-morbidities, clinically unstable presentation, and present impairments which are a regression from the patient's baseline.  (PT Clarita)   Nurse Made Aware RN Ele aware.   RUE Assessment   RUE Assessment   (Unable to formally assess d/t cognitive deficits, grossly 3+/5 observed with functional mobility.)   LUE Assessment   LUE Assessment   (Unable to formally assess d/t cognitive deficits, grossly 2+/5 observed with functional mobility w/ limited ROM.)   Hand Function   Hand Function Comments Pt able to maintain composite  and extension in R UE. L UE unable to formally assess, recommend continued assessment due to limited ROM in L UE.   Cognition   Overall Cognitive Status Impaired  (pt with cognitive impairment from prior CVA)   Attention Attends with cues to redirect   Orientation Level Oriented to person;Disoriented to place;Disoriented to time;Disoriented to situation  (pt verbalized name and month/day of birth)   Memory Decreased recall of  "biographical information;Decreased short term memory;Decreased recall of recent events;Decreased recall of precautions   Following Commands Follows one step commands inconsistently   Comments Pt visually tracking to therapist 50% of the time, minimally verbal, did respond \"yes\" and \"hi\" clearly at times. Pt with poor engagement throughout session.   Assessment   Limitation Decreased ADL status;Decreased UE strength;Decreased UE ROM;Decreased Safe judgement during ADL;Decreased cognition;Decreased endurance;Decreased self-care trans;Decreased high-level ADLs   Prognosis Fair   Assessment Patient is a 89 y.o. female seen for OT evaluation s/p admit to [facility] on 3/4/2025 w/Upper GI bleed. PMHx and Commorbidities affecting patient's functional performance at time of assessment include: upper Gi bleed, sacral ulcer, CVA, and oropharyngeal dysphagia.  Orders placed for OT evaluation and treatment.  Performed at least two patient identifiers during session including name and wristband. Pt presents from [facility] where she has been since hospitalization for subarachnoid hemorrhage in January 2025 at [facility]. Pt requires with assistance for all ADLs and IADLs from facility staff. Personal factors affecting patient at time of initial evaluation include: decreased initiation and engagement, difficulty performing ADLs, and difficulty performing IADLs. Upon evaluation, patient requires maximal assist for UB ADLs, total assist for LB ADLs, bed mobility with maximal assist x2. Patient is oriented to name,, disoriented to time,, disoriented to place,, and disoriented to situation,, with cognitive impairments from prior CVA. Occupational performance is affected by the following deficits: orientation, decreased functional use of BUEs, in hand manipulation deficit with impaired reach, grasp and coordination, limited active ROM, decreased muscle strength, dynamic sit/ stand balance deficit with poor standing " tolerance time for self care and functional mobility, decreased activity tolerance, impaired memory, impaired problem solving, decreased safety awareness, and postural control and postural alignment deficit, requiring external assistance to complete transitional movements.  Patient to benefit from continued Occupational Therapy treatment while in the hospital to address deficits as defined above and maximize level of functional independence with ADLs and functional mobility. Occupational Performance areas to address include: eating, grooming , bathing/ shower, dressing, toilet hygiene, transfer to all surfaces, functional ambulation, and functional mobility. From OT standpoint, recommendation at time of d/c would be Level 2.   Plan   Treatment Interventions ADL retraining;Functional transfer training;UE strengthening/ROM;Endurance training;Cognitive reorientation;Compensatory technique education;Energy conservation;Activityengagement   Goal Expiration Date 03/19/25   OT Treatment Day 0   OT Frequency 3-5x/wk   Discharge Recommendation   Rehab Resource Intensity Level, OT II (Moderate Resource Intensity)   AM-PAC Daily Activity Inpatient   Lower Body Dressing 1   Bathing 1   Toileting 1   Upper Body Dressing 1   Grooming 1   Eating 1   Daily Activity Raw Score 6   AM-PAC Applied Cognition Inpatient   Following a Speech/Presentation 2   Understanding Ordinary Conversation 2   Taking Medications 1   Remembering Where Things Are Placed or Put Away 1   Remembering List of 4-5 Errands 1   Taking Care of Complicated Tasks 1   Applied Cognition Raw Score 8   Applied Cognition Standardized Score 19.32   Barthel Index   Feeding 0   Bathing 0   Grooming Score 0   Dressing Score 0   Bladder Score 0   Bowels Score 0   Toilet Use Score 0   Transfers (Bed/Chair) Score 0   Mobility (Level Surface) Score 0   Stairs Score 0   Barthel Index Score 0   Modified Danni Scale   Modified Danni Scale 5   End of Consult   Patient Position  at End of Consult Supine;Bed/Chair alarm activated;All needs within reach   Nurse Communication Nurse aware of consult       Occupational Therapy goals: In 5-7 days:    1- Patient will complete rolling to R/L with use of side rail and min assist x1.   2- Patient will increase OOB/ sitting tolerance to 2-4 hours per day for increased participation in self care and leisure tasks with no s/s of exertion.  3- Patient will verbalize and demonstrate weight shifting technique in bed and sitting position with 10% verbal cues.  4- Patient will complete grooming task with set up assist.   5-Patient/ Family will demonstrate competency with UE Home Exercise Program.  6- Patient will complete Interest checklist to explore participation in play/ leisure tasks for increased satisfaction and quality of life.        AAKASH Simon, OTR/L

## 2025-03-05 NOTE — CONSULTS
e-Consult (IPC)  - Interventional Radiology  Hermila Montes 89 y.o. female MRN: [id number]  Unit/Bed#: -01 Encounter: [id number]      Interventional Radiology has been consulted to evaluate Hermila Montes    We were consulted by Internal Medicine concerning this patient with possible sacral abscess.    Inpatient Consult to IR  Consult performed by: EVELINE Baptiste  Consult ordered by: EVELINE Marr        03/05/25    Assessment/Recommendation:     89 year old female who initially presented to [facility] emergency department 3//25 from nursing facility for complaints of coffee-ground emesis.  Patient was also noted to have a large sacral ulcer with temperature of 100.2.  Patient transferred to [facility] at request of family.    Past medical history includes CVA 1/2025 with left-sided residual and expressive aphasia, or feel dysphagia, chronic Lawson catheter, PEG tube    CT with findings of sacral decubitus ulcer on the left with associated abscess in the subcutaneous soft tissues measuring 6 x 6.8 x 1.8 cm.     Interventional radiology has been consulted for patient evaluation and possible drain placement for possible sacral abscess.    Component      Latest Ref Rng 3/5/2025   POCT INR      0.85 - 1.19  1.19      3/4/25 0957  Platelet  140 - 350 thou/cmm 391 High          Called to obtain images of most recent study, CTA A/P at [facility]  Reviewed diagnostic imaging and laboratory findings  Discussed case and images with Dr. Chan   Fluid collection superficial to sacral ulcer. Not able to place a drain. No IR intervention recommended at this time.    Recommend further wound care; possible utility of vac drain/dressing?   Diet per primary care team   Remainder of care per primary care service team   Please do not hesitate to contact IR for concerns/questions     21-30 minutes, >50% of the total time devoted to medical consultative verbal/EMR discussion between  providers. Written report will be generated in the EMR.     Thank you for allowing Interventional Radiology to participate in the care of Hermila Montes.     EVELINE Baptiste

## 2025-03-05 NOTE — H&P
H&P - Hospitalist   Name: Hermila Montes 89 y.o. female I MRN: [id number]  Unit/Bed#: -01 I Date of Admission: 3/4/2025   Date of Service: 3/5/2025 I Hospital Day: 1     Assessment & Plan  Upper GI bleed  Patient presented to the [facility] ED on 3/4/2025 from [facility] due to complaints of coffee-ground emesis x 1 during the a.m. patient also had a large sacral ulcer with temp of 100.2 F at the nursing home.   Patient transferred per family's request from [facility] to [facility].  Report received from [facility] provider Dr. CAMPBELL was consulted at [facility]-with plan of supportive care with no immediate plans for endoscopy.    Plan  Keep patient n.p.o.  Serial hemoglobin hematocrit every 8 hours.  Stool records and monitor for bleeding.  Protonix IV.  GI consult should be considered if patient becomes dynamically unstable.  Oropharyngeal dysphagia  Patient has history of dysphagia. Per daughter-in-law patient was seen by speech and [facility] and was cleared for diet of purée with nectar thick liquids.  Daughter-in-law states during the days they were feeding patient food and holding tube feed and at night they were giving tube feed for supplementation.    Speech evaluation ordered.  Nutrition saltation appreciated.  Keep patient n.p.o.  Aspiration precautions.  Glucose check 6 hours with sliding scale coverage.  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  Daughter-in-law says patient has been doing physical therapy in which she has improved with movement. Patient is currently bedbound, with flaccid left arm. Patient also has expressive aphasia, daughter-in-law at 1 point patient was nonverbal but is now able to say a few words here and there.  Turn and reposition.  Specialty bed ordered.  PT and OT evaluation and treat.  Sacral ulcer (HCC)  Per daughter-in-law patient has developed sacral ulcer after being diagnosed with CVA.  Patient has had debridement done  recently and multiple treatment changes for the care of the wound.  CT abdomen pelvis-3/4/2025-No active GI bleed is identified. Sacral decubitus ulcer on the left with associated abscess in the subcutaneous soft tissues measuring 6 x 6.8 x 1.8 cm. Patulous partially fluid-filled esophagus with wall thickening. Correlate for esophagitis. Small bilateral pleural effusions with basilar atelectasis.   Wound care consulted.  IR consult appreciated.     VTE Pharmacologic Prophylaxis:   High Risk (Score >/= 5) - Pharmacological DVT Prophylaxis Contraindicated. Sequential Compression Devices Ordered.  Code Status: Full Code  Discussion with family: Updated  (daughter in law) at bedside.    Anticipated Length of Stay: Patient will be admitted on an inpatient basis with an anticipated length of stay of greater than 2 midnights secondary to GI bleed, and sacral ulcer abscess.    History of Present Illness   Chief Complaint: Coffee ground emesis x 1, sacral ulcer with abscess.     Hermila Montes is a 89 y.o. female with a PMH of CVA, sacral ulcer, oropharyngeal dysphagia, bedbound, pneumonia, respiratory failure, chronic Lawson and PEG tube who presented to the [facility] ED on 3/4/2025 from [facility] due to complaints of coffee-ground emesis x 1 during the a.m. patient also had a large sacral ulcer with temp of 100.2 F at the nursing home.  Patient currently has a PEG tube that was placed due to patient's recent CVA about 9 weeks ago and patient during that time having difficulty swallowing and development of aspiration pneumonia.  Daughter-in-law states patient passed speech evaluation and is currently on nectar thick liquids and puréed diet and patient has been fed by mouth during the day and at bedtime tube feed is set up for infusion. Patient also has a Lawson that was placed due to sacral ulcer per Radha, daughter-in-law.  Patient's sacral wound was debrided recently and prior treatment was changed  to Santyl. Patient limitation to mobility and expressive speech. CT abdomen did not reveal acute bleeding, but it revealed a sacral ulcer with 6 cm abscess noted with a fluid-filled esophagus. Stool occult negative.  At [facility] patient was kept n.p.o., Protonix was given, GI was consulted, hemoglobin was checked, blood cultures collected and wound cultures collected and patient was started on vancomycin, cefepime and Flagyl antibiotics. Patient's daughter-in-law expressed not wanting patient to be sedated if possible.     Review of Systems   Constitutional:  Negative for appetite change, chills and fever.   HENT:  Positive for trouble swallowing. Negative for congestion, drooling, ear pain and sore throat.    Eyes:  Negative for pain and visual disturbance.   Respiratory:  Negative for cough and shortness of breath.    Cardiovascular:  Negative for chest pain and palpitations.   Gastrointestinal:  Positive for diarrhea. Negative for abdominal pain, constipation, nausea and vomiting.   Genitourinary:  Negative for dysuria and hematuria.   Musculoskeletal:  Negative for arthralgias and back pain.   Skin:  Positive for wound. Negative for color change and rash.   Neurological:  Positive for speech difficulty. Negative for seizures, syncope, facial asymmetry and weakness.   Psychiatric/Behavioral:  Positive for confusion. Negative for behavioral problems.    All other systems reviewed and are negative.      Historical Information   Past Medical History:   Diagnosis Date    Hypertension      Past Surgical History:   Procedure Laterality Date    APPENDECTOMY      CYST REMOVAL Right      Social History     Tobacco Use    Smoking status: Former    Smokeless tobacco: Never   Substance and Sexual Activity    Alcohol use: Never    Drug use: Never    Sexual activity: Not on file     E-Cigarette/Vaping     E-Cigarette/Vaping Substances     History reviewed. No pertinent family history.  Social History:  Marital Status: Unknown    Occupation: N/A  Patient Pre-hospital Living Situation: Long Term Care Facility  Patient Pre-hospital Level of Mobility: non-ambulatory/bed bound  Patient Pre-hospital Diet Restrictions: Peg tube for tube feeds; but patient was on puree diet with nectar thick liquids.     Meds/Allergies   I have reviewed home medications with patient family member.  Prior to Admission medications    Medication Sig Start Date End Date Taking? Authorizing Provider   Ascorbic Acid, Vitamin C, (VITAMIN C) 100 MG tablet Take 1 tablet by mouth in the morning    Historical Provider, MD   hydrochlorothiazide (HYDRODIURIL) 12.5 mg tablet Take 12.5 mg by mouth in the morning 5/6/22   Historical Provider, MD   lisinopril (ZESTRIL) 20 mg tablet Take 20 mg by mouth 2 (two) times a day 7/11/22   Historical Provider, MD     Allergies   Allergen Reactions    Ammonia Shortness Of Breath     Bleach -     Bleach -       Objective :  Temp:  [100.1 °F (37.8 °C)] 100.1 °F (37.8 °C)  HR:  [95] 95  BP: (119)/(59) 119/59  Resp:  [20] 20  SpO2:  [92 %] 92 %  O2 Device: None (Room air)    Physical Exam  Vitals and nursing note reviewed.   Constitutional:       General: She is awake. She is not in acute distress.     Appearance: She is underweight.      Comments: Expressive aphasia   HENT:      Head: Normocephalic and atraumatic.      Nose: No congestion.      Mouth/Throat:      Mouth: Mucous membranes are dry.   Eyes:      General:         Right eye: No discharge.         Left eye: No discharge.      Conjunctiva/sclera: Conjunctivae normal.   Cardiovascular:      Rate and Rhythm: Normal rate and regular rhythm.      Pulses: Decreased pulses.      Heart sounds: No murmur heard.  Pulmonary:      Effort: Pulmonary effort is normal. No respiratory distress.      Breath sounds: Decreased breath sounds present.      Comments: Room air  Abdominal:      General: Abdomen is flat. Bowel sounds are increased. There is no distension.      Palpations: Abdomen is soft.       Tenderness: There is abdominal tenderness.      Comments: Peg tube present.   Musculoskeletal:         General: No swelling.      Cervical back: Neck supple.      Right lower leg: No edema.      Left lower leg: No edema.   Skin:     General: Skin is warm and dry.      Capillary Refill: Capillary refill takes less than 2 seconds.      Comments: Sacral ulcer   Neurological:      Mental Status: She is alert and oriented to person, place, and time.      GCS: GCS eye subscore is 4. GCS verbal subscore is 3. GCS motor subscore is 3.      Cranial Nerves: Facial asymmetry present.      Motor: Weakness present.      Gait: Gait abnormal.   Psychiatric:         Mood and Affect: Mood normal.          Lines/Drains:      Lab Results: I have reviewed the following results:  Results from last 7 days   Lab Units 03/04/25  1341   HEMOGLOBIN g/dL 8.5*   HEMATOCRIT % 24.3*     Results from last 7 days   Lab Units 03/04/25  0957   SODIUM mmol/L 134*   POTASSIUM mmol/L 4.4   CHLORIDE mmol/L 98*   CO2 mmol/L 29   BUN mg/dL 26*   CREATININE mg/dL 0.78   ANION GAP  7   CALCIUM mg/dL 9.2   ALBUMIN g/dL 3.2*   TOTAL BILIRUBIN mg/dL 0.3   ALK PHOS U/L 83   ALT U/L 21   AST U/L 16   GLUCOSE RANDOM mg/dL 154*     Results from last 7 days   Lab Units 03/04/25  0957   INR  1         Lab Results   Component Value Date    HGBA1C 6.2 (H) 01/06/2025           Imaging Results Review: I reviewed radiology reports from this admission including: CT abdomen/pelvis.  Other Study Results Review: No additional pertinent studies reviewed.    Administrative Statements   I have spent a total time of 50 minutes in caring for this patient on the day of the visit/encounter including Patient and family education, Counseling / Coordination of care, Documenting in the medical record, Reviewing/placing orders in the medical record (including tests, medications, and/or procedures), Obtaining or reviewing history  , and Communicating with other healthcare  professionals .    ** Please Note: This note has been constructed using a voice recognition system. **

## 2025-03-05 NOTE — SPEECH THERAPY NOTE
Speech-Language Pathology Bedside Swallow Evaluation        Patient Name: Hermila Montes  Today's Date: 3/5/2025     Problem List  Principal Problem:    Upper GI bleed  Active Problems:    Sacral ulcer (HCC)    CVA (cerebral vascular accident) (HCC)    Oropharyngeal dysphagia       Past Medical History  Past Medical History:   Diagnosis Date    Hypertension        Past Surgical History  Past Surgical History:   Procedure Laterality Date    APPENDECTOMY      CYST REMOVAL Right        Summary/Impressions:    Pt w/ known hx oropharyngeal dysphagia 2/2 CVA.  PEG in place.  Presently w/ poor engagement/attention.  Accepts tactile thermal stimulation via moistened swab x5.  Noted to initiate spontaneous swallows 5/5 trials for mgmt of secretions.  Maintains seemingly patent airway throughout. Transitioned to puree solid which pt immediately ejects anteriorly in refusal.  Orally defensive, refusing all additional OP offerings.    With considering of known oropharyngeal dysphagia, aspiration risk, and current mental status; continue NPO x tube feeds until pt able to participate in complete bedside dysphagia evaluation w/ trials of baseline diet (see below).     Recommendations:   Diet: NPO with tube feeds   Meds: non-oral if possible   Frequent Oral care: 2-4x/day  Aspiration precautions and compensatory swallowing strategies: upright posture and oral care   Other Recommendations/ considerations: SLP will continue to follow for ongoing evaluation to determine safety of oral diet x1-3 as pt status permits       Current Medical Status  Pt is a 89 y.o. female who presented to [facility] who initially presented to [facility] emergency department 3//25 from nursing facility for complaints of coffee-ground emesis.  Patient was also noted to have a large sacral ulcer with temperature of 100.2.  Patient transferred to [facility] at request of family.   Patient has history of dysphagia. Per  daughter-in-law patient was seen by speech and [facility] and was cleared for diet of purée with nectar thick liquids. Daughter-in-law states during the days they were feeding patient food and holding tube feed and at night they were giving tube feed for supplementation.     Past medical history:  Please see H&P for details    Special Studies:  1/21/25 MBS:  Moderate oropharyngeal dysphagia with inconsistent oral holding/transfers. Overall delayed swallow. Reduced hyolaryngeal movement. ? Piecemeal transfers vs oral residue. Difficult to view oral cavity. There is deep penetration with thins vai cup, ? NTL via cup, and ? Puree. Pt did not follow commands to cough or additionally swallow to clear supraglottic retention.  *At this time, pt remains largely at risk for aspiration despite none visualized on VFSS this date. Significant fatigue component.     Social/Education/Vocational Hx:  Pt lives in SNF/ECF    Swallow Information   Current Risks for Dysphagia & Aspiration: known history of dysphagia and known history of aspiration  Current Symptoms/Concerns:  hx dysphagia, AMS  Current Diet: NPO   Baseline Diet:  puree/nectar per family report +TF    Baseline Assessment   Behavior/Cognition: decreased attention  Speech/Language Status: not able to to follow commands  Patient Positioning: upright in bed    Swallow Mechanism Exam   Facial: symmetrical  Labial: unable to test 2/2 limited command following  Lingual: unable to test 2/2 limited command following  Velum: unable to visualize  Mandible:  decreased ROM  Dentition: edentulous  Vocal quality: ?WFL    Volitional Cough: unable to initiate volitional cough   Respiratory: RA    Consistencies Assessed and Performance   Consistencies Administered:  ice swab, x1 puree     Oral Stage: decreased reception to PO offerings/moistened swab.  Benefits from verbal cues for engagement and redirection.  Extracts trace quantities from swab and initiates oral transfer.  Ejects puree in  refusal.    Pharyngeal Stage: Laryngeal rise noted upon palpation across all thermal stimulation attempts.  Pt noted to elicit spontaneous swallows.  Refuses PO offerings.  No s/s of aspiration or distress.  Secretion mgmt appears adequate.     Esophageal Concerns: none reported    Results Reviewed with: patient, RN, and MD     Plan  Will continue to follow for x1-3    Dysphagia Goals: pt will participate in repeat swallow eval to determine safety of po intake and/or further instrumental testing.        Jeane Lopez MS, CCC-SLP  Speech-Language Pathologist  [place] #[id number]  [place] #[id number]

## 2025-03-05 NOTE — ASSESSMENT & PLAN NOTE
Anemia is likely multifactorial, however patient was noted to have coffee-ground emesis on admission.  Unfortunately the patient has suffered CVA and recent COVID infection.  Unclear what her baseline was prior to her CVA in January.  Left a message for patient's daughter to call back regarding patient's goals of care.  Continue to monitor hemoglobin closely and trances as necessary to goal hemoglobin above 7.

## 2025-03-05 NOTE — CONSULTS
Consultation - Surgery-General   Name: Hermila Montes 89 y.o. female I MRN: [id number]  Unit/Bed#: -01 I Date of Admission: 3/4/2025   Date of Service: 3/5/2025 I Hospital Day: 1   Pharmacy general consult  Consult performed by: Sumi Phelps PA-C  Consult ordered by: EVELINE Marr      Inpatient consult to Acute Care Surgery  Consult performed by: Sumi Phelps PA-C  Consult ordered by: Osman Girard MD        Physician Requesting Evaluation: Osman Girard MD   Reason for Evaluation / Principal Problem: sacral decubitus     Assessment & Plan  Sacral ulcer (HCC)  Pt is a transfer from [facility] at request of family, pt admitted 3/4/25 from nursing facility for hematemesis, and sacral decubitus ulcer with temp 100.2.  Pt is non-ambulatory due to CVA1/2025,she had  recent debridement and wound care prior to arrival at [facility].     CT with findings of sacral decubitus ulcer on the left with associated abscess in the subcutaneous soft tissues measuring 6 x 6.8 x 1.8 cm.   IR consulted-superficial fluid collection, unable to place drain,     -plan for bedside debridement  4 x 4 and ABD dressing, change daily and prn saturation   -offloading  -reposition q 2 hrs.  -Santyl to open wound daily, irrigate with NS         Media Information      Document Information    Clinical Image - Mobile Device   Wound 03/05/25 Sacrum   03/05/2025 09:45   Attached To:   Hospital Encounter on 3/4/25   Source Information    Elizabeth De La Rosa RN  [facility]   Document History      Upper GI bleed  Per GI   CVA (cerebral vascular accident) (HCC)  Per [facility]IM   Oropharyngeal dysphagia  Per [facility], speech consult \  Peg in place     History of Present Illness   Hermila Montes is a 89 y.o. female 1/2025, hemorrhagic CVA with left arm flaccid, dysphagia, non ambulatory, sacral decubitus, GI bleed, Peg tube in place,           who was transferred from [facility] at family request, pt was admitted there 3/4/25 from [facility]  due to vomiting coffee ground material, she had a sacral decubitus ulcer and a temp of 200/1.   Wound Care evaluation this am noted ulcer was in need of debridement, she was found to have a fluid collection  superficial in the skin an was not able to have IR drain per IR consult.     Pt is s/p hemorrhagic CVA January 2025, she is non ambulatory, flaccid left arm     Review of Systems  Pt is non verbal.       Objective :  Temp:  [98.5 °F (36.9 °C)-100.1 °F (37.8 °C)] 98.5 °F (36.9 °C)  HR:  [80-95] 80  BP: (115-119)/(50-59) 115/50  Resp:  [18-20] 18  SpO2:  [92 %-96 %] 96 %  O2 Device: None (Room air)    Lines/Drains/Airways       Active Status       Name Placement date Placement time Site Days    Gastrostomy/Enterostomy Percutaneous Endoscopic Gastrostomy (PEG) Umbilicus 03/05/25  0059  Umbilicus  less than 1    Urethral Catheter 03/05/25  0059  --  less than 1                  Physical Exam  Constitutional:       Appearance: She is ill-appearing.      Comments: Pt is cachectic, frail appearance, rigid posture, contracture of left UE, paralysis s/p CVA 1/2025  Pt is non verbal.      Cardiovascular:      Rate and Rhythm: Normal rate and regular rhythm.   Pulmonary:      Effort: Pulmonary effort is normal.      Breath sounds: Normal breath sounds.   Abdominal:      General: Abdomen is flat. Bowel sounds are normal.      Palpations: Abdomen is soft.   Musculoskeletal:         General: Normal range of motion.   Skin:     Comments: 7 x 7 x unmeasureable depth due to necrotic tissue.   Large amount of necrotic tissue covering the entire wound, black eschar in 6 to 9 o'clock position and soft yellow necrotic tissue of the remainder of the surface of the decubitus ulcer.   Debrided 1-2 cm deep to healthy bleeding tissue, hemostasis complete with pressure and gauze. No erythema or fluctuance of the charity wound. No abscess within the wound.    Neurological:      Mental Status: She is alert.      Comments: Non verbal, let UE  paralysis, mild soft tissue edema of the hand,          Lab Results: I have reviewed the following results:  Recent Labs     03/04/25  1341 03/05/25  0233 03/05/25  0610 03/05/25  0825   HGB 8.5*  --   --  7.3*   HCT 24.3*  --   --   --    SODIUM  --  135  --   --    K  --  4.6  --   --    CL  --  101  --   --    CO2  --  28  --   --    BUN  --  30*  --   --    CREATININE  --  0.94  --   --    GLUC  --  153*  --   --    MG  --  1.8*  --   --    PHOS  --  4.3*  --   --    AST  --  13  --   --    ALT  --  16  --   --    ALB  --  2.9*  --   --    TBILI  --  0.46  --   --    ALKPHOS  --  63  --   --    PTT  --   --  40*  --    INR  --   --  1.19  --      Sumi BAILEY

## 2025-03-05 NOTE — CASE MANAGEMENT
Case Management Assessment & Discharge Planning Note    Patient name Hermila Montes  Location /-01 MRN [id number]  : 1933 Date 3/5/2025       Current Admission Date: 3/4/2025  Current Admission Diagnosis:Upper GI bleed   Patient Active Problem List    Diagnosis Date Noted Date Diagnosed    Sacral ulcer (HCC) 2025     Upper GI bleed 2025     CVA (cerebral vascular accident) (HCC) 2025     Oropharyngeal dysphagia 2025       LOS (days): 1  Geometric Mean LOS (GMLOS) (days):   Days to GMLOS:     OBJECTIVE:    Risk of Unplanned Readmission Score: 8.57         Current admission status: Inpatient       Preferred Pharmacy:   [facility] - [facility] - [id number]  [place] [address]  Phone: [phone] Fax: [phone]    Primary Care Provider: Everardo Leach MD    Primary Insurance: [employer] [facility]  Secondary Insurance:     ASSESSMENT:  Active Health Care Proxies    There are no active Health Care Proxies on file.       DISCHARGE DETAILS:  Additional Comments: CM left VM for pt's daughter, Radha ([phone]), asking for return pc to complete CM assessment.     ADDENDUM 2:10pm: CM received return pc from pt's daughter-in-law, Radha. Radha reports her and her  reside in [place] and wanted to know if CM would be able to get pt placed in [place]. CM informed that was not an option and she would need to return to [facility] on discharge and work on a transfer to a [place] facility while at [facility]. Radha agreeable to this. Radha also reported they have a medical transport service setup that they could have pt transported from [facility] to [place] and they might end up doing that and have pt go to a hospital in [place]. CM informed that, that type of transition would be something the family would have to setup and CM would not be able to advise or assist with that plan. Radha reported  understanding. KULDIP referral sent to [facility] and they are able to accept pt back on discharge.

## 2025-03-05 NOTE — QUICK NOTE
Had a long discussion with patient's POA and daughter-in-law Radha  She states that patient is able to move bilateral lower extremities and right upper extremity but not the left upper extremity at baseline.  I did tell her my examination the patient did not respond to any commands and was not moving during exam as well.  She stated that they were trying to wean her off of her antiseizure medications which were started after she was diagnosed with hemorrhagic stroke recently at [facility].  She does not want those medications to be continued and wants the antiseizure medications to be weaned off.  She stated that [facility] was weaning her off the medications but I do not have any record from [facility] at this time.  We have requested records.  We also discussed goals of care to which Radha said she wanted full code for now and depending on how she does further she will make further decisions.  She is aware of the decline in patient's mental and physiologic status at this time.  She does not want any anesthesia affiliated testing like EGD at this time.  She is okay with continuing Protonix and monitoring hemoglobin and she is okay with getting blood transfusions as well.  She is aware that the prognosis is poor

## 2025-03-05 NOTE — PLAN OF CARE
Problem: OCCUPATIONAL THERAPY ADULT  Goal: Performs self-care activities at highest level of function for planned discharge setting.  See evaluation for individualized goals.  Description: Treatment Interventions: ADL retraining, Functional transfer training, UE strengthening/ROM, Endurance training, Cognitive reorientation, Compensatory technique education, Energy conservation, Activityengagement          See flowsheet documentation for full assessment, interventions and recommendations.   Outcome: Progressing  Note: Limitation: Decreased ADL status, Decreased UE strength, Decreased UE ROM, Decreased Safe judgement during ADL, Decreased cognition, Decreased endurance, Decreased self-care trans, Decreased high-level ADLs  Prognosis: Fair  Assessment: Patient is a 89 y.o. female seen for OT evaluation s/p admit to [facility] on 3/4/2025 w/Upper GI bleed. PMHx and Commorbidities affecting patient's functional performance at time of assessment include: upper Gi bleed, sacral ulcer, CVA, and oropharyngeal dysphagia.  Orders placed for OT evaluation and treatment.  Performed at least two patient identifiers during session including name and wristband. Pt presents from [facility] where she has been since hospitalization for subarachnoid hemorrhage in January 2025 at [facility]. Pt requires with assistance for all ADLs and IADLs from facility staff. Personal factors affecting patient at time of initial evaluation include: decreased initiation and engagement, difficulty performing ADLs, and difficulty performing IADLs. Upon evaluation, patient requires maximal assist for UB ADLs, total assist for LB ADLs, bed mobility with maximal assist x2. Patient is oriented to name,, disoriented to time,, disoriented to place,, and disoriented to situation,, with cognitive impairments from prior CVA. Occupational performance is affected by the following deficits: orientation, decreased functional use of BUEs, in hand  manipulation deficit with impaired reach, grasp and coordination, limited active ROM, decreased muscle strength, dynamic sit/ stand balance deficit with poor standing tolerance time for self care and functional mobility, decreased activity tolerance, impaired memory, impaired problem solving, decreased safety awareness, and postural control and postural alignment deficit, requiring external assistance to complete transitional movements.  Patient to benefit from continued Occupational Therapy treatment while in the hospital to address deficits as defined above and maximize level of functional independence with ADLs and functional mobility. Occupational Performance areas to address include: eating, grooming , bathing/ shower, dressing, toilet hygiene, transfer to all surfaces, functional ambulation, and functional mobility. From OT standpoint, recommendation at time of d/c would be Level 2.     Rehab Resource Intensity Level, OT: II (Moderate Resource Intensity)

## 2025-03-05 NOTE — DISCHARGE INSTR - OTHER ORDERS
Wound/Skin Care Plan:  1-B/L heels: cleanse gently with soap and water, pat dry gently. Apply silicone foam dressing to wound, arpan with T for treatment, change every other day and PRN if soiled or displaced. Peel back dressing to inspect skin every shift.   2-Sacrum: Cleanse with NSS, pat dry gently. Apply Santyl to wound bed, cover with dry 4x4 (times 3) and cover with large ABD pad a secure with paper tape. Change daily and PRN if soiled or displaced.   2-Turn/reposition q2h or when medically stable for pressure re-distribution on skin, utilizing ATR turning and repositioning system.   3-Elevate heels to offload pressure, utilize offloading boots  4-Moisturize skin daily with skin nourishing cream  5-Ehob cushion in chair when out of bed.

## 2025-03-05 NOTE — PLAN OF CARE
Problem: DISCHARGE PLANNING  Goal: Discharge to home or other facility with appropriate resources  Description: INTERVENTIONS:  - Identify barriers to discharge w/patient and caregiver  - Arrange for needed discharge resources and transportation as appropriate  - Identify discharge learning needs (meds, wound care, etc.)  - Arrange for interpretive services to assist at discharge as needed  - Refer to Case Management Department for coordinating discharge planning if the patient needs post-hospital services based on physician/advanced practitioner order or complex needs related to functional status, cognitive ability, or social support system  3/5/2025 0038 by Eva Carpio RN  Outcome: Progressing  3/4/2025 2140 by Eva Carpio RN  Outcome: Progressing     Problem: Prexisting or High Potential for Compromised Skin Integrity  Goal: Skin integrity is maintained or improved  Description: INTERVENTIONS:  - Identify patients at risk for skin breakdown  - Assess and monitor skin integrity  - Assess and monitor nutrition and hydration status  - Monitor labs   - Assess for incontinence   - Turn and reposition patient  - Assist with mobility/ambulation  - Relieve pressure over bony prominences  - Avoid friction and shearing  - Provide appropriate hygiene as needed including keeping skin clean and dry  - Evaluate need for skin moisturizer/barrier cream  - Collaborate with interdisciplinary team   - Patient/family teaching  - Consider wound care consult   Outcome: Progressing

## 2025-03-05 NOTE — ASSESSMENT & PLAN NOTE
Patient has history of dysphagia. Per daughter-in-law patient was seen by speech and [facility] and was cleared for diet of purée with nectar thick liquids.  Daughter-in-law states during the days they were feeding patient food and holding tube feed and at night they were giving tube feed for supplementation.    Speech evaluation ordered.  Nutrition saltation appreciated.  Keep patient n.p.o.  Aspiration precautions.  Glucose check 6 hours with sliding scale coverage.

## 2025-03-05 NOTE — MALNUTRITION/BMI
This medical record reflects one or more clinical indicators suggestive of malnutrition.    Malnutrition Findings:   Adult Malnutrition type: Chronic illness  Adult Degree of Malnutrition: Other severe protein calorie malnutrition  Malnutrition Characteristics: Muscle loss, Weight loss     360 Statement: Malnutrition related to chronic medical conditions as evidenced by 6% weight loss in 1 month and severe muscle wasting (clavicles, temples). Treated with Enteral nutrition via PEG tube (pending EN initiation)      See Nutrition note dated 3/5/25 for additional details.  Completed nutrition assessment is viewable in the nutrition documentation.

## 2025-03-05 NOTE — PROGRESS NOTES
Hermila Montes is a 89 y.o. female who is currently ordered Vancomycin IV with management by the Pharmacy Consult service.  Relevant clinical data and objective / subjective history reviewed.  Vancomycin Assessment:  Indication and Goal AUC/Trough: Soft tissue (goal -600, trough >10), -600, trough >10  Clinical Status:  New start  Micro:   pending  Renal Function:  SCr: 0.78 mg/dL  CrCl: 40.1 mL/min  Renal replacement: Not on dialysis  Days of Therapy: 1  Current Dose: 500mg Q12H (ordered dose)  Vancomycin Plan:  New Dosinmg Q24H  Estimated AUC: 484 mcg*hr/mL  Estimated Trough: 12.5 mcg/mL  Next Level: 25  Renal Function Monitoring: Daily BMP and UOP  Pharmacy will continue to follow closely for s/sx of nephrotoxicity, infusion reactions and appropriateness of therapy.  BMP and CBC will be ordered per protocol. We will continue to follow the patient’s culture results and clinical progress daily.    Blue Pickard, Pharmacist

## 2025-03-05 NOTE — RESPIRATORY THERAPY NOTE
RT Protocol Note  Hermila Montes 89 y.o. female MRN: [id number]  Unit/Bed#: -01 Encounter: [id number]    Assessment    Principal Problem:    Upper GI bleed  Active Problems:    Sacral ulcer (HCC)    CVA (cerebral vascular accident) (HCC)    Oropharyngeal dysphagia      Home Pulmonary Medications:  none       Past Medical History:   Diagnosis Date    Hypertension      Social History     Socioeconomic History    Marital status: Unknown     Spouse name: None    Number of children: None    Years of education: None    Highest education level: None   Occupational History    None   Tobacco Use    Smoking status: Former    Smokeless tobacco: Never   Substance and Sexual Activity    Alcohol use: Never    Drug use: Never    Sexual activity: None   Other Topics Concern    None   Social History Narrative    None     Social Drivers of Health     Financial Resource Strain: Not At Risk (2/7/2025)    Received from [facility]    Financial Insecurity     In the last 12 months did you skip medications to save money?: No     In the last 12 months was there a time when you needed to see a doctor but could not because of cost?: No   Food Insecurity: No Food Insecurity (3/4/2025)    Nursing - Inadequate Food Risk Classification     Worried About Running Out of Food in the Last Year: Not on file     Ran Out of Food in the Last Year: Not on file     Ran Out of Food in the Last Year: Never true   Transportation Needs: No Transportation Needs (3/4/2025)    Nursing - Transportation Risk Classification     Lack of Transportation: Not on file     Lack of Transportation: No   Physical Activity: Not on file   Stress: Not on file   Social Connections: Socially Isolated (2/7/2025)    Received from [facility]    Social Connection     Do you often feel lonely?: Yes   Intimate Partner Violence: Unknown (3/4/2025)    Nursing IPS     Feels Physically and Emotionally Safe: Not on file     Physically Hurt by  "Someone: Not on file     Humiliated or Emotionally Abused by Someone: Not on file     Physically Hurt by Someone: No     Hurt or Threatened by Someone: No   Housing Stability: Unknown (3/4/2025)    Nursing: Inadequate Housing Risk Classification     Has Housing: Not on file     Worried About Losing Housing: Not on file     Unable to Get Utilities: Not on file     Unable to Pay for Housing in the Last Year: No     Has Housin       Subjective         Objective    Physical Exam:   Assessment Type: Assess only  General Appearance: Drowsy  Respiratory Pattern: Normal  Chest Assessment: Chest expansion symmetrical  Bilateral Breath Sounds: Diminished, Clear    Vitals:  Blood pressure 119/59, pulse 95, temperature 100.1 °F (37.8 °C), temperature source Axillary, resp. rate 20, height 5' 6\" (1.676 m), weight 54.9 kg (121 lb 0.5 oz), SpO2 92%.          Imaging and other studies:           Plan    Respiratory Plan: Discontinue Protocol        Resp Comments: Pt with no pulmonary history and no home meds, pt admited for upper GI bleed. No idication for respiratory involvment. Will discontinue protocol   "

## 2025-03-05 NOTE — ASSESSMENT & PLAN NOTE
Pt is a transfer from [facility] at request of family, pt admitted 3/4/25 from nursing facility for hematemesis, and sacral decubitus ulcer with temp 100.2.  Pt is non-ambulatory due to CVA1/2025,she had  recent debridement and wound care prior to arrival at [facility].     CT with findings of sacral decubitus ulcer on the left with associated abscess in the subcutaneous soft tissues measuring 6 x 6.8 x 1.8 cm.   IR consulted-superficial fluid collection, unable to place drain,     -plan for bedside debridement  4 x 4 and ABD dressing, change daily and prn saturation   -offloading  -reposition q 2 hrs.  -Santyl to open wound daily, irrigate with NS         Media Information      Document Information    Clinical Image - Mobile Device   Wound 03/05/25 Sacrum   03/05/2025 09:45   Attached To:   Hospital Encounter on 3/4/25   Source Information    Elizabeth De La Rosa RN  [facility]   Document History

## 2025-03-05 NOTE — NUTRITION
03/05/25 1314   Recommendations/Interventions   Interventions/Recommendations Lab consider order (Specify);Coordination of care;Tube feeding recs provided;Other (Specify)  (electrolytes, phosphorus)   Recommendations to Provider When medically indicated initiate Continuous EN via PEG tube to provide 100% of needs. Recommend start Jevity 1.2 at 20mL/hr x 24 hours and advance by 10 mL every 4 hours until goal rate of 50mL/hr is reached. Flush tube with 150mL of free water every 6 hours. Add 1 packet Prosource no carb daily to EN order. This provides 1500kcals, 81g PRO, 968mL free water from formula and 600mL free water flushes for tv 1568mls/day. Monitor electrolytes, phosphorus, and adjust IVF and insulin accordingly. Advance diet texture per SLP recommendation for comfort feeds only.

## 2025-03-05 NOTE — PROGRESS NOTES
Bedside Excisional debridement - General Surgery   Herimla Montes 89 y.o. female MRN: [id number]  Unit/Bed#: -01 Encounter: [id number]      Assessment:   7 x 7 x 2 cm decubitus ulcer of the sacrum, entire surface is covered with black eschar and yellow soft necrotic tissue, no fluctuance of the charity wound,       Procedure  A time-out was performed prior to the initiation of the bedside debridement, and patient agreeable to procedure.  Patient positioning: right side down in the bed   Local anesthetic used (type and volume):none   Adequate analgesia in the area to be debrided was verified.     Technique used: bedside debridement of large amount of necrotic tissue   Instruments used:11 blade, scissor and forceps   Description of tissue removed: black eschar and yellow soft necrotic tissue   Appearance and size of wound: 7 x 7x 2 cm . Irregular and rolled skin edges, no erythema of the charity wound, no fluctuance   Depth of the debridement (depth, layer exposed, appearance of underlying tissue): approximately 2 cm  Dressing used (packing and external dressing):      Media Information      Document Information    Clinical Image - Mobile Device   Wound 03/05/25 Sacrum   03/05/2025 09:45   Attached To:   Hospital Encounter on 3/4/25   Source Information    Elizabeth De La Rosa RN  [facility]   Document History      Patient tolerated the procedure well, and adequate hemostasis was achieved at the completion of the procedure.   Upon completion of the bedside procedure, the patient was hemodynamically stable, and vitals were WNL.         Sumi Phelps PA-C  3/5/2025

## 2025-03-05 NOTE — PLAN OF CARE
Problem: PHYSICAL THERAPY ADULT  Goal: Performs mobility at highest level of function for planned discharge setting.  See evaluation for individualized goals.  Description: Treatment/Interventions: LE strengthening/ROM, Therapeutic exercise, Endurance training, Patient/family training, Equipment eval/education, Bed mobility, Continued evaluation, Spoke to nursing, OT          See flowsheet documentation for full assessment, interventions and recommendations.  3/5/2025 1433 by Clarita Hwang PT  Note: Prognosis: Guarded  Problem List: Decreased strength, Decreased range of motion, Decreased endurance, Impaired balance, Decreased mobility, Decreased cognition, Decreased skin integrity, Pain  Assessment: Pt is 89 y.o. female seen for PT evaluation on 3/5/2025 s/p admit to [facility] on 3/4/2025 w/ Upper GI bleed. PT was consulted to assess pt's functional mobility and d/c needs. Order placed for PT eval and tx. PTA, pt presents from [facility] where she has been since hospitalization for subarachnoid hemorrhage in January 2025 at [facility]. At time of eval, pt requiring max assist x2 for rolling. Upon evaluation, pt presenting with impaired functional mobility d/t decreased strength, decreased ROM, decreased endurance, impaired balance, decreased mobility, decreased cognition, decreased skin integrity, pain, and activity intolerance. Pertinent PMHx and current co-morbidities affecting pt's physical performance at time of assessment include: upper Gi bleed, sacral ulcer, CVA, oropharyngeal dysphagia. Personal factors affecting pt at time of eval include: inability to navigate level surfaces w/o external assistance, decreased cognition, and decreased initiation and engagement. The following objective measures performed on IE also reveal limitations: Barthel Index: 0/100, Modified Osborne: 5 (severe disability), and AM-PAC 6-Clicks: 6/24. Pt's clinical presentation is currently unstable/unpredictable seen in pt's  presentation of advanced age, abnormal lab value(s), and ongoing medical assessment. Overall, pt's rehab potential and prognosis to return to PLOF is guarded as impacted by objective findings, warranting pt to receive further skilled PT interventions to address identified impairments, activity limitation(s), and participation restriction(s). Pt to benefit from continued PT tx to address deficits as defined above and maximize level of functional independent mobility. From PT/mobility standpoint, recommend level 2, moderate resource intensity in order to facilitate return to PLOF.  Barriers to Discharge: Inaccessible home environment, Decreased caregiver support     Rehab Resource Intensity Level, PT: II (Moderate Resource Intensity)    See flowsheet documentation for full assessment.     3/5/2025 1433 by Clarita Hwang PT  Note: Prognosis: Guarded  Problem List: Decreased strength, Decreased range of motion, Decreased endurance, Impaired balance, Decreased mobility, Decreased cognition, Decreased skin integrity, Pain  Assessment: Pt is 89 y.o. female seen for PT evaluation on 3/5/2025 s/p admit to [facility] on 3/4/2025 w/ Upper GI bleed. PT was consulted to assess pt's functional mobility and d/c needs. Order placed for PT eval and tx. PTA, pt presents from [facility] where she has been since hospitalization for subarachnoid hemorrhage in January 2025 at [facility]. At time of eval, pt requiring max assist x2 for rolling. Upon evaluation, pt presenting with impaired functional mobility d/t decreased strength, decreased ROM, decreased endurance, impaired balance, decreased mobility, decreased cognition, decreased skin integrity, pain, and activity intolerance. Pertinent PMHx and current co-morbidities affecting pt's physical performance at time of assessment include: upper Gi bleed, sacral ulcer, CVA, oropharyngeal dysphagia. Personal factors affecting pt at time of eval include: inability to navigate level  surfaces w/o external assistance, decreased cognition, and decreased initiation and engagement. The following objective measures performed on IE also reveal limitations: Barthel Index: 0/100, Modified Danni: 5 (severe disability), and AM-PAC 6-Clicks: 6/24. Pt's clinical presentation is currently unstable/unpredictable seen in pt's presentation of advanced age, abnormal lab value(s), and ongoing medical assessment. Overall, pt's rehab potential and prognosis to return to PLOF is guarded as impacted by objective findings, warranting pt to receive further skilled PT interventions to address identified impairments, activity limitation(s), and participation restriction(s). Pt to benefit from continued PT tx to address deficits as defined above and maximize level of functional independent mobility. From PT/mobility standpoint, recommend level 2, moderate resource intensity in order to facilitate return to PLOF.  Barriers to Discharge: Inaccessible home environment, Decreased caregiver support     Rehab Resource Intensity Level, PT: II (Moderate Resource Intensity)    See flowsheet documentation for full assessment.

## 2025-03-05 NOTE — TELEPHONE ENCOUNTER
Patients GI provider:  JOE Scruggs     Number to return call: [phone]    Reason for call: Pt's Daughter, Radha returning a call to provider post hospital consult.  Please have provider return her call     Scheduled procedure/appointment date if applicable: na

## 2025-03-05 NOTE — PHYSICAL THERAPY NOTE
Physical Therapy Evaluation     Patient's Name: Hermila Montes    Admitting Diagnosis  Upper GI bleed [K92.2]    Problem List  Patient Active Problem List   Diagnosis    Sacral ulcer (HCC)    Upper GI bleed    CVA (cerebral vascular accident) (HCC)    Oropharyngeal dysphagia       Past Medical History  Past Medical History:   Diagnosis Date    Hypertension        Past Surgical History  Past Surgical History:   Procedure Laterality Date    APPENDECTOMY      CYST REMOVAL Right           03/05/25 0920   PT Last Visit   PT Visit Date 03/05/25   Note Type   Note type Evaluation   Pain Assessment   Pain Assessment Tool FLACC   Pain Rating: FLACC (Rest) - Face 1   Pain Rating: FLACC (Rest) - Legs 0   Pain Rating: FLACC (Rest) - Activity 0   Pain Rating: FLACC (Rest) - Cry 0   Pain Rating: FLACC (Rest) - Consolability 0   Score: FLACC (Rest) 1   Pain Rating: FLACC (Activity) - Face 1   Pain Rating: FLACC (Activity) - Legs 0   Pain Rating: FLACC (Activity) - Activity 1   Pain Rating: FLACC (Activity) - Cry 0   Pain Rating: FLACC (Activity) - Consolability 0   Score: FLACC (Activity) 2   Restrictions/Precautions   Weight Bearing Precautions Per Order No   Other Precautions Cognitive;Bed Alarm;Fall Risk;Pain  (sacral wound)   Home Living   Type of Home SNF  ([facility] [place] [facility])   Prior Function   Lives With Facility staff   Receives Help From Other (Comment)  (staff at facility)   Comments pt unable to provide PLOF and home s/u information d/t cognitive impairments, CM to follow up   General   Family/Caregiver Present No   Cognition   Overall Cognitive Status Impaired   Arousal/Participation Arousable   Orientation Level Oriented to person;Disoriented to place;Disoriented to time;Disoriented to situation  (pt verbalized name and month/day of birth)   Memory Decreased recall of biographical information;Decreased short term memory;Decreased recall of recent events;Decreased recall of precautions   Following Commands Follows one step  "commands inconsistently   Comments pt visually tracking to therapist 50% of the time, minimally verbal, did respond \"yes\" and \"hi\" clearly at times. poor engagement   RLE Assessment   RLE Assessment   (unable to formally assess d/t cognitive deficits, grossly 2+/5 observed with functional mobility)   LLE Assessment   LLE Assessment   (unable to formally assess d/t cognitive deficits, grossly 2+/5 observed with functional mobility)   Coordination   Movements are Fluid and Coordinated   (unable to formally assess, recommend continued assessment)   Sensation   (unable to formally assess, recommend continued assessment)   Bed Mobility   Rolling R 2  Maximal assistance   Additional items Assist x 2;HOB elevated;Increased time required;Verbal cues;LE management   Rolling L 2  Maximal assistance   Additional items Assist x 2;HOB elevated;Increased time required;Verbal cues;LE management   Supine to Sit   (further mobility deferred at this time)   Endurance Deficit   Endurance Deficit Yes   Activity Tolerance   Activity Tolerance Patient limited by fatigue;Treatment limited secondary to medical complications (Comment)   Medical Staff Made Aware Pt seen as a co-eval with OT due to the patient's co-morbidities, clinically unstable presentation, and present impairments which are a regression from the patient's baseline.   Nurse Made Aware RN Ele   Assessment   Prognosis Guarded   Problem List Decreased strength;Decreased range of motion;Decreased endurance;Impaired balance;Decreased mobility;Decreased cognition;Decreased skin integrity;Pain   Assessment Pt is 89 y.o. female seen for PT evaluation on 3/5/2025 s/p admit to [facility] on 3/4/2025 w/ Upper GI bleed. PT was consulted to assess pt's functional mobility and d/c needs. Order placed for PT eval and tx. PTA, pt presents from [facility] where she has been since hospitalization for subarachnoid hemorrhage in January 2025 at [facility]. At time of eval, pt requiring " max assist x2 for rolling. Upon evaluation, pt presenting with impaired functional mobility d/t decreased strength, decreased ROM, decreased endurance, impaired balance, decreased mobility, decreased cognition, decreased skin integrity, pain, and activity intolerance. Pertinent PMHx and current co-morbidities affecting pt's physical performance at time of assessment include: upper Gi bleed, sacral ulcer, CVA, oropharyngeal dysphagia. Personal factors affecting pt at time of eval include: inability to navigate level surfaces w/o external assistance, decreased cognition, and decreased initiation and engagement. The following objective measures performed on IE also reveal limitations: Barthel Index: 0/100, Modified Moran: 5 (severe disability), and AM-PAC 6-Clicks: 6/24. Pt's clinical presentation is currently unstable/unpredictable seen in pt's presentation of advanced age, abnormal lab value(s), and ongoing medical assessment. Overall, pt's rehab potential and prognosis to return to PLOF is guarded as impacted by objective findings, warranting pt to receive further skilled PT interventions to address identified impairments, activity limitation(s), and participation restriction(s). Pt to benefit from continued PT tx to address deficits as defined above and maximize level of functional independent mobility. From PT/mobility standpoint, recommend level 2, moderate resource intensity in order to facilitate return to PLOF.   Barriers to Discharge Inaccessible home environment;Decreased caregiver support   Goals   Patient Goals none expressed   STG Expiration Date 03/15/25   Short Term Goal #1 In 7-10 days: Increase bilateral LE strength 1/2 grade to facilitate independent mobility, Perform all bed mobility tasks with mod A of 1 to decrease caregiver burden, Improve Barthel Index score to 20 or greater to facilitate independence, and PT to see and establish goals for transfers, sitting/standing balance, ambulation if/  when appropriate. PT to continue to formally evaluate strength, sensation, coordination as able.   PT Treatment Day 0   Plan   Treatment/Interventions LE strengthening/ROM;Therapeutic exercise;Endurance training;Patient/family training;Equipment eval/education;Bed mobility;Continued evaluation;Spoke to nursing;OT   PT Frequency 2-3x/wk   Discharge Recommendation   Rehab Resource Intensity Level, PT II (Moderate Resource Intensity)   AM-PAC Basic Mobility Inpatient   Turning in Flat Bed Without Bedrails 1   Lying on Back to Sitting on Edge of Flat Bed Without Bedrails 1   Moving Bed to Chair 1   Standing Up From Chair Using Arms 1   Walk in Room 1   Climb 3-5 Stairs With Railing 1   Basic Mobility Inpatient Raw Score 6   Turning Head Towards Sound 2   Follow Simple Instructions 1   Low Function Basic Mobility Raw Score  9   Low Function Basic Mobility Standardized Score  12.55   [facility] Level Of Mobility   -HLM Goal 2: Bed activities/Dependent transfer   -HLM Achieved 2: Bed activities/Dependent transfer   Modified Danni Scale   Modified Danni Scale 5   Barthel Index   Feeding 0   Bathing 0   Grooming Score 0   Dressing Score 0   Bladder Score 0   Bowels Score 0   Toilet Use Score 0   Transfers (Bed/Chair) Score 0   Mobility (Level Surface) Score 0   Stairs Score 0   Barthel Index Score 0         Clarita Hwang, PT

## 2025-03-05 NOTE — ASSESSMENT & PLAN NOTE
Per daughter-in-law patient has developed sacral ulcer after being diagnosed with CVA.  Patient has had debridement done recently and multiple treatment changes for the care of the wound.  CT abdomen pelvis-3/4/2025-No active GI bleed is identified. Sacral decubitus ulcer on the left with associated abscess in the subcutaneous soft tissues measuring 6 x 6.8 x 1.8 cm. Patulous partially fluid-filled esophagus with wall thickening. Correlate for esophagitis. Small bilateral pleural effusions with basilar atelectasis.   Wound care consulted.  IR consult appreciated.

## 2025-03-05 NOTE — CONSULTS
Consultation - Gastroenterology   Name: Hermila Montes 89 y.o. female I MRN: [id number]  Unit/Bed#: -01 I Date of Admission: 3/4/2025   Date of Service: 3/5/2025 I Hospital Day: 1   Inpatient consult to gastroenterology  Consult performed by: Otilia Scruggs PA-C  Consult ordered by: EVELINE Marr        Physician Requesting Evaluation: Osman Girard MD   Reason for Evaluation / Principal Problem: Coffee ground emesis, anemia    Assessment & Plan  Upper GI bleed  Anemia is likely multifactorial, however patient was noted to have coffee-ground emesis on admission.  Unfortunately the patient has suffered CVA and recent COVID infection.  Unclear what her baseline was prior to her CVA in January.  Left a message for patient's daughter to call back regarding patient's goals of care.  Continue to monitor hemoglobin closely and trances as necessary to goal hemoglobin above 7.  Sacral ulcer (HCC)  Evaluated by wound care and general surgery.  CVA (cerebral vascular accident) (HCC)    I have discussed the above management plan in detail with the primary service.  Patient will be seen and examined by Dr. Francois, all medical decisions were made with Dr. Francois.    History of Present Illness   HPI:  Hermila Montes is a 89 y.o. female with history of hemorrhagic CVA, sacral wound, and hypertension.  Patient's history is obtained from the electronic medical health record as the patient is unable to give a meaningful history.  There is no family at the bedside during my encounter this morning.  Per care everywhere, the patient recently suffered a subdural hematoma in January 2025, and since then she has been bedbound.  She is unable to communicate.  She has a PEG tube and chronic indwelling Lawson catheter.  GI was consulted as the patient presented from the nursing home with coffee-ground emesis and drop in hemoglobin.  She was recently admitted to [facility], and it was decided by the GI team to  continue conservative management.    On admission, hemoglobin 8.5.  7.3 today.  Last available baseline from 2024 prior to her change in health was 12.  Last CT chest, abdomen and pelvis was done without contrast at [facility] in March 4 revealing sacral decubitus ulcer measuring 6 x 6 x 1.8 cm.  Partially fluid-filled esophagus was noted with wall thickening.    Review of Systems   Reason unable to perform ROS: AMS, dementia.         Objective :  Temp:  [98.5 °F (36.9 °C)-100.1 °F (37.8 °C)] 98.5 °F (36.9 °C)  HR:  [80-95] 80  BP: (115-119)/(50-59) 115/50  Resp:  [18-20] 18  SpO2:  [92 %-96 %] 96 %  O2 Device: None (Room air)    Physical Exam  Constitutional:       Appearance: She is well-developed. She is ill-appearing. She is not diaphoretic.   HENT:      Head: Normocephalic and atraumatic.   Neck:      Thyroid: No thyromegaly.      Trachea: No tracheal deviation.   Pulmonary:      Effort: Pulmonary effort is normal.   Abdominal:      Palpations: Abdomen is soft. Abdomen is not rigid. There is no mass.      Tenderness: There is no abdominal tenderness. There is no guarding or rebound.      Comments: PEG tube   Musculoskeletal:      Cervical back: Neck supple.   Lymphadenopathy:      Cervical: No cervical adenopathy.   Skin:     General: Skin is warm and dry.      Findings: No erythema or rash.   Neurological:      Mental Status: She is disoriented.   Psychiatric:         Cognition and Memory: Cognition is impaired.         Lab Results: I have reviewed the following results:

## 2025-03-06 PROBLEM — G93.40 ENCEPHALOPATHY ACUTE: Status: ACTIVE | Noted: 2025-03-06

## 2025-03-06 PROBLEM — Z51.5 PALLIATIVE CARE PATIENT: Status: ACTIVE | Noted: 2025-03-06

## 2025-03-06 LAB
ABO GROUP BLD: NORMAL
ABO GROUP BLD: NORMAL
BLD GP AB SCN SERPL QL: NEGATIVE
BUN SERPL-MCNC: 32 MG/DL (ref 5–25)
GLUCOSE SERPL-MCNC: 120 MG/DL (ref 65–140)
GLUCOSE SERPL-MCNC: 122 MG/DL (ref 65–140)
GLUCOSE SERPL-MCNC: 123 MG/DL (ref 65–140)
GLUCOSE SERPL-MCNC: 138 MG/DL (ref 65–140)
GLUCOSE SERPL-MCNC: 156 MG/DL (ref 65–140)
HCT VFR BLD AUTO: 20.9 % (ref 34.8–46.1)
HGB BLD-MCNC: 6.7 G/DL (ref 11.5–15.4)
HGB BLD-MCNC: 6.9 G/DL (ref 11.5–15.4)
HGB BLD-MCNC: 9.2 G/DL (ref 11.5–15.4)
MRSA NOSE QL CULT: NORMAL
RH BLD: POSITIVE
RH BLD: POSITIVE
SPECIMEN EXPIRATION DATE: NORMAL

## 2025-03-06 PROCEDURE — 99233 SBSQ HOSP IP/OBS HIGH 50: CPT | Performed by: FAMILY MEDICINE

## 2025-03-06 PROCEDURE — 85014 HEMATOCRIT: CPT | Performed by: FAMILY MEDICINE

## 2025-03-06 PROCEDURE — 99223 1ST HOSP IP/OBS HIGH 75: CPT | Performed by: NURSE PRACTITIONER

## 2025-03-06 PROCEDURE — 86900 BLOOD TYPING SEROLOGIC ABO: CPT | Performed by: PHYSICIAN ASSISTANT

## 2025-03-06 PROCEDURE — 86901 BLOOD TYPING SEROLOGIC RH(D): CPT | Performed by: PHYSICIAN ASSISTANT

## 2025-03-06 PROCEDURE — 86923 COMPATIBILITY TEST ELECTRIC: CPT

## 2025-03-06 PROCEDURE — P9016 RBC LEUKOCYTES REDUCED: HCPCS

## 2025-03-06 PROCEDURE — 86850 RBC ANTIBODY SCREEN: CPT | Performed by: PHYSICIAN ASSISTANT

## 2025-03-06 PROCEDURE — 84520 ASSAY OF UREA NITROGEN: CPT | Performed by: PHYSICIAN ASSISTANT

## 2025-03-06 PROCEDURE — 85018 HEMOGLOBIN: CPT | Performed by: FAMILY MEDICINE

## 2025-03-06 PROCEDURE — 30233N1 TRANSFUSION OF NONAUTOLOGOUS RED BLOOD CELLS INTO PERIPHERAL VEIN, PERCUTANEOUS APPROACH: ICD-10-PCS | Performed by: FAMILY MEDICINE

## 2025-03-06 PROCEDURE — 92526 ORAL FUNCTION THERAPY: CPT

## 2025-03-06 PROCEDURE — 82948 REAGENT STRIP/BLOOD GLUCOSE: CPT

## 2025-03-06 PROCEDURE — 99232 SBSQ HOSP IP/OBS MODERATE 35: CPT | Performed by: INTERNAL MEDICINE

## 2025-03-06 PROCEDURE — 85018 HEMOGLOBIN: CPT

## 2025-03-06 PROCEDURE — NC001 PR NO CHARGE: Performed by: PHYSICIAN ASSISTANT

## 2025-03-06 RX ORDER — ACETAMINOPHEN 10 MG/ML
1000 INJECTION, SOLUTION INTRAVENOUS EVERY 8 HOURS PRN
Status: DISPENSED | OUTPATIENT
Start: 2025-03-06 | End: 2025-03-07

## 2025-03-06 RX ADMIN — COLLAGENASE SANTYL: 250 OINTMENT TOPICAL at 13:34

## 2025-03-06 RX ADMIN — METRONIDAZOLE 500 MG: 500 INJECTION, SOLUTION INTRAVENOUS at 03:55

## 2025-03-06 RX ADMIN — METRONIDAZOLE 500 MG: 500 INJECTION, SOLUTION INTRAVENOUS at 23:03

## 2025-03-06 RX ADMIN — VANCOMYCIN HYDROCHLORIDE 1000 MG: 1 INJECTION, SOLUTION INTRAVENOUS at 03:55

## 2025-03-06 RX ADMIN — CEFEPIME 2000 MG: 2 INJECTION, POWDER, FOR SOLUTION INTRAVENOUS at 13:33

## 2025-03-06 RX ADMIN — CEFEPIME 2000 MG: 2 INJECTION, POWDER, FOR SOLUTION INTRAVENOUS at 03:55

## 2025-03-06 RX ADMIN — ACETAMINOPHEN 1000 MG: 10 INJECTION INTRAVENOUS at 17:36

## 2025-03-06 RX ADMIN — METRONIDAZOLE 500 MG: 500 INJECTION, SOLUTION INTRAVENOUS at 14:30

## 2025-03-06 RX ADMIN — PANTOPRAZOLE SODIUM 40 MG: 40 INJECTION, POWDER, FOR SOLUTION INTRAVENOUS at 17:46

## 2025-03-06 RX ADMIN — PANTOPRAZOLE SODIUM 40 MG: 40 INJECTION, POWDER, FOR SOLUTION INTRAVENOUS at 03:55

## 2025-03-06 NOTE — ASSESSMENT & PLAN NOTE
Per daughter-in-law patient has developed sacral ulcer after being diagnosed with CVA.  Patient has had debridement done recently and multiple treatment changes for the care of the wound.  CT abdomen pelvis-3/4/2025-No active GI bleed is identified. Sacral decubitus ulcer on the left with associated abscess in the subcutaneous soft tissues measuring 6 x 6.8 x 1.8 cm. Patulous partially fluid-filled esophagus with wall thickening. Correlate for esophagitis. Small bilateral pleural effusions with basilar atelectasis.   Wound care consulted.  IR consult appreciated: no indication for drain as per recs  General surgery consulted and debridement done at beside. Appreciate recommendations

## 2025-03-06 NOTE — ASSESSMENT & PLAN NOTE
Palliative Diagnosis:     Admission to [facility] 1/6 thru 1/26 this year subarachnoid, subdural, CVA, Left hemiplegia with G tube placement.  Admission [facility] 2/7 thru 2/14 for pneumonia  Goals:   No limits on cares.   Spoke to patient's son Ross who confirms Level 1 full code.  Remains hopeful for the patient to improve enough to be able to socialize at a nursing facility and play games.    Patient independent prior to January (shoveling, cleaning out gutters)  Palliative will follow for ongoing goals of care discussions as situation evolves.    Social Support:  Patient's support system: son, Ross, daughter in law Radha,   Ryan live in [place], DIL has been local helping with patient for past 9 weeks.   Supportive listening provided  Normalized experience of patient  Advocated for patient/family with interdisciplinary team    Care Coordination  Case discussed with SLIM     Follow-up  We appreciate the opportunity to participate in this patient's care.   We will continue to follow while admitted.    Please do not hesitate to contact our on-call provider through [employer] Secure Chat or contact [phone] should there be an acute change or other symptom control concerns.

## 2025-03-06 NOTE — SPEECH THERAPY NOTE
Speech Language/Pathology     Speech/Language Pathology Progress Note     Patient Name: Hermila Montes    Today's Date: 3/6/2025     Problem List  Principal Problem:    Upper GI bleed  Active Problems:    Sacral ulcer (HCC)    CVA (cerebral vascular accident) (HCC)    Oropharyngeal dysphagia    Severe protein-calorie malnutrition (HCC)    ACUTE POSTHEMORRHAGIC ANEMIA       Subjective:  Patient received sleeping, wakes to voice and light touch.  Facial engagement noted though remains fleeting. Eyes open/ pt awake throughout.     Previous/current diet: NPO - PEG in place no TF ordered at this time     Objective:  Pt offered tastes of puree solid to assess potential initiation of pleasure feeds.  Weak labial strip to spoon, likely 2/2 poor reception of offering.  Eventually accepts trace quantities w/ max verbal redirection and cues.  Pt continues to verbalize with material in oral cavity.  Material noted throughout w/ no evidence of purposeful bolus formation and transfer.  Noted to elicit spontaneous swallows though once again does not appear purposeful as pt not noted to attend to material in oral cavity at any point.  Trials limited by poor bolus reception/disengagement.  Further offerings d/c for pt safety.       Assessment:  PO intake limited by impaired cognitive status, high aspiration risk.  Likely unable to meet chloric needs via oral feeds.   Given poor engagement, cognitive function in conjunction with known hx oropharyngeal dysphagia +aspiration, concerns for potential pna, ?aspiration pna; continue NPO.  Consider initiation of TF for nutrition. Maintain strict reflux precautions.      Plan:  NPO; consider enteral nutrition   Aspiration/reflux precautions   ST follow-up to determine safety of pleasure feeds as indicated/pt status permits x1-2      Jeane Lopez MS, CCC-SLP  Speech-Language Pathologist  [place] #[id number]  [place] #[id number]

## 2025-03-06 NOTE — ASSESSMENT & PLAN NOTE
Sacral decubitis present on admission with associated abscess in the subcutaneous soft tissues measuring 6 x 6.8 x 1.8 cm.   Surgical consult, bedside debridement completed.  IR unable to place drain for abscess.

## 2025-03-06 NOTE — ASSESSMENT & PLAN NOTE
Metabolic from sacral infection vs toxic from antiseizure meds vs worsening baseline cognition s/p CVA and recent COVID vs delirum  Plan to wean off gradually of anti seizure meds   Poor prognosis  Palliative consulted

## 2025-03-06 NOTE — ASSESSMENT & PLAN NOTE
Pts POA does not want any procedures with anesthesia and as per the discussion with GI they dont want EGD  Hb dropping causing ABLA, 1 U ordered , verbal consent from DIL/POA obtainted  Cont IV protonix  Cont to check H&H Q8H

## 2025-03-06 NOTE — CASE MANAGEMENT
Case Management Discharge Planning Note    Patient name Hermila Montes  Location /-01 MRN [id number]  : 1933 Date 3/6/2025       Current Admission Date: 3/4/2025  Current Admission Diagnosis:Upper GI bleed   Patient Active Problem List    Diagnosis Date Noted Date Diagnosed    Encephalopathy acute 2025     Palliative care patient 2025     Sacral ulcer (HCC) 2025     Upper GI bleed 2025     CVA (cerebral vascular accident) (HCC) 2025     Oropharyngeal dysphagia 2025     Severe protein-calorie malnutrition (HCC) 2025     ACUTE POSTHEMORRHAGIC ANEMIA 2025       LOS (days): 2  Geometric Mean LOS (GMLOS) (days): 2.9  Days to GMLOS:1.3     OBJECTIVE:  Risk of Unplanned Readmission Score: 8.83         Current admission status: Inpatient   Preferred Pharmacy:   [facility] - [facility] - [id number]  [facility] [address]  [id number]  [place] [address]  Phone: [phone] Fax: [phone]    Primary Care Provider: Everardo Leach MD    Primary Insurance: MEDICARE  Secondary Insurance:     DISCHARGE DETAILS:                                          Other Referral/Resources/Interventions Provided:  Referral Comments: Spoke with son Alex and family does want her to return to [facility] at D/C. Referral sent

## 2025-03-06 NOTE — ASSESSMENT & PLAN NOTE
January 2025, patient with subdural and subarachnoid hemorrhage.  New left sided hemiplegia, G tube placed during 20 day admission.

## 2025-03-06 NOTE — ASSESSMENT & PLAN NOTE
"Repeat CT head is as follows:\"Findings suggestive of subacute to chronic infarct in the right posterior parietal region with possible residual subacute to chronic subarachnoid hemorrhage.\"  "

## 2025-03-06 NOTE — PLAN OF CARE
NPO; consider enteral nutrition   Aspiration/reflux precautions   ST follow-up to determine safety of pleasure feeds as indicated/pt status permits x1-2

## 2025-03-06 NOTE — ASSESSMENT & PLAN NOTE
Malnutrition Findings:   Adult Malnutrition type: Chronic illness  Adult Degree of Malnutrition: Other severe protein calorie malnutrition  Malnutrition Characteristics: Muscle loss, Weight loss     360 Statement: Malnutrition related to chronic medical conditions as evidenced by 6% weight loss in 1 month and severe muscle wasting (clavicles, temples). Treated with Enteral nutrition via PEG tube (pending EN initiation)    BMI Findings:       Body mass index is 19.54 kg/m².   Patient has PEG tube, enteral feedings continue.

## 2025-03-06 NOTE — PROGRESS NOTES
Brief Surgery Note, Staging of Sacral Decubitus Ulcer     Sacral pressure ulcer, stage 4 POA due to decreased mobility in the setting of recent stroke a/e/b left sacral wound measuring 6x6.8x1.8cm with abscess treated with wound care consultation, IR consultation, Q2 hour turn and reposition, offload pressure, evaluate for pressure reduction mattress, registered dietitian evaluation to optimize protein/calorie intake for wound healing.          Finding:  Wound is stage 4 after debridement.     Sumi [place] C    154.94

## 2025-03-06 NOTE — ASSESSMENT & PLAN NOTE
Anemia is likely multifactorial, however patient was noted to have coffee-ground emesis on admission.  Unfortunately the patient has suffered CVA and recent COVID infection.  Discussed care with patient's daughter-in-law, Radha yesterday.  Prior to her stroke, the patient was still rather independent.  After our discussion yesterday, they would like to avoid anesthesia and are open to supportive care for anemia.  Unfortunately, her her hemoglobin dropped again overnight down to 6.9.  1 unit of packed red blood cells is being ordered.  -Await response of blood transfusion  -If no appropriate improvement, may have to rediscuss endoscopy as the patient's family is treatment focused per consult by palliative care  -PPI twice daily  -Monitor color bowel movements, fortunately the stools have been brown therefore no real evidence of brisk GI bleeding

## 2025-03-06 NOTE — ASSESSMENT & PLAN NOTE
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

## 2025-03-06 NOTE — NURSING NOTE
Patients daughter in-law states that Hermila pulled out the anchor for the JPEG on 3/5/25 and feels that caused the bleeding in her stomach. She wants GI aware of this and hopes treatment will go accordingly.

## 2025-03-06 NOTE — PROGRESS NOTES
Progress Note - Hospitalist   Name: Hermila Montes 89 y.o. female I MRN: [id number]  Unit/Bed#: -01 I Date of Admission: 3/4/2025   Date of Service: 3/6/2025 I Hospital Day: 2    Assessment & Plan  Upper GI bleed  Pts POA does not want any procedures with anesthesia and as per the discussion with GI they dont want EGD  Hb dropping causing ABLA, 1 U ordered , verbal consent from DIL/POA obtainted  Cont IV protonix  Cont to check H&H Q8H  Encephalopathy acute  Metabolic from sacral infection vs toxic from antiseizure meds vs worsening baseline cognition s/p CVA and recent COVID vs delirum  Plan to wean off gradually of anti seizure meds   Poor prognosis  Palliative consulted  Oropharyngeal dysphagia  Patient has history of dysphagia. Per daughter-in-law patient was seen by speech and [facility] and was cleared for diet of purée with nectar thick liquids.  Daughter-in-law states during the days they were feeding patient food and holding tube feed and at night they were giving tube feed for supplementation.  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  Daughter-in-law says patient has been doing physical therapy in which she has improved with movement. Patient is currently bedbound, with flaccid left arm. Patient also has expressive aphasia, daughter-in-law at 1 point patient was nonverbal but is now able to say a few words here and there.  Turn and reposition.  Specialty bed ordered.  PT and OT evaluation and treat.  Sacral ulcer (HCC)  Per daughter-in-law patient has developed sacral ulcer after being diagnosed with CVA.  Patient has had debridement done recently and multiple treatment changes for the care of the wound.  CT abdomen pelvis-3/4/2025-No active GI bleed is identified. Sacral decubitus ulcer on the left with associated abscess in the subcutaneous  soft tissues measuring 6 x 6.8 x 1.8 cm. Patulous partially fluid-filled esophagus with wall thickening. Correlate for esophagitis. Small bilateral pleural effusions with basilar atelectasis.   Wound care consulted.  IR consult appreciated: no indication for drain as per recs  General surgery consulted and debridement done at beside. Appreciate recommendations  Severe protein-calorie malnutrition (HCC)  Malnutrition Findings:   Adult Malnutrition type: Chronic illness  Adult Degree of Malnutrition: Other severe protein calorie malnutrition  Malnutrition Characteristics: Muscle loss, Weight loss                  360 Statement: Malnutrition related to chronic medical conditions as evidenced by 6% weight loss in 1 month and severe muscle wasting (clavicles, temples). Treated with Enteral nutrition via PEG tube (pending EN initiation)    BMI Findings:           Body mass index is 19.54 kg/m².     Starting tube feeds  Severe protein-calorie malnutrition POA duet to increased nutritional needs in the setting of acute illness  and wound healing a/e/b 6% weight loss in 1 month and severe muscle wasting (clavicles, temples) treated with Enteral nutrition and ongoing registered nutritionist evaluation for optimal protein/calorie intake.      ACUTE POSTHEMORRHAGIC ANEMIA  1 U PRBC ordered  H&H Q8H  Verbal consent from DIL obtained      Sacral pressure ulcer, stage 4 POA due to decreased mobility in the setting of recent stroke a/e/b left sacral wound measuring 6x6.8x1.8cm with abscess treated with wound care consultation, IR consultation, Q2 hour turn and reposition, offload pressure, evaluate for pressure reduction mattress, registered dietitian evaluation to optimize protein/calorie intake for wound healing.     VTE Pharmacologic Prophylaxis: VTE Score: 5  no AC due to dropping H&H. SCDS ordered    Mobility:   Basic Mobility Inpatient Raw Score: 6  -[facility] Goal: 2: Bed activities/Dependent transfer  -HL Achieved: 1: Laying  in bed  JH-HLM Goal NOT achieved. Continue with multidisciplinary rounding and encourage appropriate mobility to improve upon JH-HLM goals.    Patient Centered Rounds: I performed bedside rounds with nursing staff today.   Discussions with Specialists or Other Care Team Provider: palliative, GI, gen surgery    Education and Discussions with Family / Patient: Updated  (daughter in law) via phone.  Consented verbally for prbc transfusion. No EGD  Wants the antiseizure meds to be tapered down as she believes that's the cause of her mentation currently    Also discussed with the physician who is taking care of her at [facility].  He states that and the current mentation that I described to him is close to her baseline.  She was not very verbal and she would just nod her head for yes or no.    Current Length of Stay: 2 day(s)  Current Patient Status: Inpatient   Certification Statement: The patient will continue to require additional inpatient hospital stay due to see above  Discharge Plan:  undecided timeline, likey >48H to [facility] as per family's wishes    Code Status: Level 1 - Full Code    Subjective   Pt seen and examined at bedside during am rounds  Pt appears more awake today  No overnight events  No fever  Continues to be not very responsive to questions but says 'yes'     Objective :  Temp:  [98.1 °F (36.7 °C)-100.2 °F (37.9 °C)] 98.1 °F (36.7 °C)  HR:  [89-96] 91  BP: ()/(53-66) 113/64  Resp:  [16-18] 18  SpO2:  [90 %-96 %] 96 %  O2 Device: None (Room air)    Body mass index is 19.54 kg/m².     Input and Output Summary (last 24 hours):     Intake/Output Summary (Last 24 hours) at 3/6/2025 0848  Last data filed at 3/6/2025 0530  Gross per 24 hour   Intake 2767.5 ml   Output 1700 ml   Net 1067.5 ml       Physical Exam  S1,2 (+) R  Lungs CTA  Sacral stage 4 decubitus ulcer   Does not follow commands well  Neck supple  No pedal edema  Unable to do detailed neuro exam due to pts abnormal  mentation    Lines/Drains:  Lines/Drains/Airways       Active Status       Name Placement date Placement time Site Days    Gastrostomy/Enterostomy Percutaneous Endoscopic Gastrostomy (PEG) Umbilicus 03/05/25  0059  Umbilicus  1    Urethral Catheter 03/05/25  0059  --  1                  Urinary Catheter:  Goal for removal: N/A - Chronic Lawson                 Lab Results: I have reviewed the following results:   Results from last 7 days   Lab Units 03/06/25  0635 03/05/25  0825 03/04/25  2101   HEMOGLOBIN g/dL 6.9*   < > 8.1*   HEMATOCRIT %  --   --  23.3*    < > = values in this interval not displayed.     Results from last 7 days   Lab Units 03/05/25  0233   SODIUM mmol/L 135   POTASSIUM mmol/L 4.6   CHLORIDE mmol/L 101   CO2 mmol/L 28   BUN mg/dL 30*   CREATININE mg/dL 0.94   ANION GAP mmol/L 6   CALCIUM mg/dL 8.6   ALBUMIN g/dL 2.9*   TOTAL BILIRUBIN mg/dL 0.46   ALK PHOS U/L 63   ALT U/L 16   AST U/L 13   GLUCOSE RANDOM mg/dL 153*     Results from last 7 days   Lab Units 03/05/25  0610   INR  1.19     Results from last 7 days   Lab Units 03/06/25  0751 03/05/25  2054 03/05/25  1828 03/05/25  1628 03/05/25  1054 03/05/25  0738 03/05/25  0149   POC GLUCOSE mg/dl 156* 115 134 131 112 117 144*               Recent Cultures (last 7 days):   Results from last 7 days   Lab Units 03/05/25  0458   URINE CULTURE  Culture results to follow.       Imaging Results Review: I reviewed radiology reports from this admission including: CT head.possible residual subacute to chronic subarachnoid hemorrhage   Other Study Results Review: Other studies reviewed include: XR chest, ECHO from 1/8/25 65% EF    Last 24 Hours Medication List:     Current Facility-Administered Medications:     acetaminophen (Ofirmev) injection 1,000 mg, Q8H PRN    albuterol (PROVENTIL HFA,VENTOLIN HFA) inhaler 2 puff, Q4H PRN    amLODIPine (NORVASC) tablet 10 mg, Daily    cefepime (MAXIPIME) 2 g/50 mL dextrose IVPB, Q12H, Last Rate: 100 mL/hr at 03/06/25  0530    collagenase (SANTYL) ointment, Daily    insulin lispro (HumALOG/ADMELOG) 100 units/mL subcutaneous injection 1-5 Units, Q6H NURIA **AND** Fingerstick Glucose (POCT), Q6H    levETIRAcetam (KEPPRA) injection 250 mg, Daily With Breakfast **AND** levETIRAcetam (KEPPRA) injection 500 mg, HS    metroNIDAZOLE (FLAGYL) IVPB (premix) 500 mg 100 mL, Q8H, Last Rate: 500 mg (03/06/25 0355)    multi-electrolyte (Plasmalyte-A/Isolyte-S PH 7.4/Normosol-R) IV solution, Continuous, Last Rate: 75 mL/hr (03/06/25 0530)    OXcarbazepine (TRILEPTAL) oral suspension 150 mg, BID    pantoprazole (PROTONIX) injection 40 mg, Q12H    vancomycin (VANCOCIN) IVPB (premix in dextrose) 1,000 mg 200 mL, Q24H, Last Rate: 1,000 mg (03/06/25 0355)    Administrative Statements   Today, Patient Was Seen By: Osman Girard MD  I have spent a total time of 60 minutes in caring for this patient on the day of the visit/encounter including Diagnostic results, Prognosis, Risks and benefits of tx options, Instructions for management, Patient and family education, Importance of tx compliance, Risk factor reductions, Impressions, Counseling / Coordination of care, Documenting in the medical record, Reviewing/placing orders in the medical record (including tests, medications, and/or procedures), Obtaining or reviewing history  , and Communicating with other healthcare professionals .    **Please Note: This note may have been constructed using a voice recognition system.**

## 2025-03-06 NOTE — PROGRESS NOTES
"Progress Note - Gastroenterology   Name: Hermila Montes 89 y.o. female I MRN: [id number]  Unit/Bed#: -01 I Date of Admission: 3/4/2025   Date of Service: 3/6/2025 I Hospital Day: 2    Assessment & Plan  Upper GI bleed  Anemia is likely multifactorial, however patient was noted to have coffee-ground emesis on admission.  Unfortunately the patient has suffered CVA and recent COVID infection.  Discussed care with patient's daughter-in-law, Radha yesterday.  Prior to her stroke, the patient was still rather independent.  After our discussion yesterday, they would like to avoid anesthesia and are open to supportive care for anemia.  Unfortunately, her her hemoglobin dropped again overnight down to 6.9.  1 unit of packed red blood cells is being ordered.  -Await response of blood transfusion  -If no appropriate improvement, may have to rediscuss endoscopy as the patient's family is treatment focused per consult by palliative care  -PPI twice daily  -Monitor color bowel movements, fortunately the stools have been brown therefore no real evidence of brisk GI bleeding  Sacral ulcer (HCC)  Evaluated by wound care and general surgery.  CVA (cerebral vascular accident) (HCC)  Repeat CT head is as follows:\"Findings suggestive of subacute to chronic infarct in the right posterior parietal region with possible residual subacute to chronic subarachnoid hemorrhage.\"    I have discussed the above management plan in detail with the primary service.  Patient will be seen and examined by Dr. Francois, all medical decisions were made with Dr. Francois.    Subjective   Patient is more alert today, but unable to make meaningful conversation.  Per nursing, there have been no evidence of melena or further episode of coffee-ground emesis.  Stool has been brown.    Objective :  Temp:  [98.1 °F (36.7 °C)-100.2 °F (37.9 °C)] 98.4 °F (36.9 °C)  HR:  [88-96] 93  BP: ()/(53-72) 145/72  Resp:  [16-18] 16  SpO2:  [90 %-96 %] 95 %  O2 Device: " None (Room air)    Physical Exam  Constitutional:       General: She is not in acute distress.     Appearance: She is well-developed. She is not diaphoretic.   HENT:      Head: Normocephalic and atraumatic.   Eyes:      General: No scleral icterus.     Conjunctiva/sclera: Conjunctivae normal.      Pupils: Pupils are equal, round, and reactive to light.   Neck:      Thyroid: No thyromegaly.      Trachea: No tracheal deviation.   Cardiovascular:      Rate and Rhythm: Normal rate and regular rhythm.   Pulmonary:      Effort: Pulmonary effort is normal.      Breath sounds: Normal breath sounds. No wheezing or rales.   Abdominal:      General: Bowel sounds are normal. There is no distension.      Palpations: Abdomen is soft. Abdomen is not rigid. There is no mass.      Tenderness: There is no abdominal tenderness. There is no guarding or rebound.   Musculoskeletal:      Cervical back: Neck supple.   Lymphadenopathy:      Cervical: No cervical adenopathy.   Skin:     General: Skin is warm and dry.      Findings: No erythema or rash.   Neurological:      Mental Status: She is alert and oriented to person, place, and time.   Psychiatric:         Behavior: Behavior normal.         Cognition and Memory: Cognition is impaired.           Lab Results: I have reviewed the following results:    Imaging Results Review: I reviewed radiology reports from this admission including: CT head.

## 2025-03-06 NOTE — ASSESSMENT & PLAN NOTE
Malnutrition Findings:   Adult Malnutrition type: Chronic illness  Adult Degree of Malnutrition: Other severe protein calorie malnutrition  Malnutrition Characteristics: Muscle loss, Weight loss                  360 Statement: Malnutrition related to chronic medical conditions as evidenced by 6% weight loss in 1 month and severe muscle wasting (clavicles, temples). Treated with Enteral nutrition via PEG tube (pending EN initiation)    BMI Findings:           Body mass index is 19.54 kg/m².     Starting tube feeds  Severe protein-calorie malnutrition POA duet to increased nutritional needs in the setting of acute illness  and wound healing a/e/b 6% weight loss in 1 month and severe muscle wasting (clavicles, temples) treated with Enteral nutrition and ongoing registered nutritionist evaluation for optimal protein/calorie intake.

## 2025-03-06 NOTE — CONSULTS
Consultation - Palliative Care   Name: Hermila Montes 89 y.o. female I MRN: [id number]  Unit/Bed#: -01 I Date of Admission: 3/4/2025   Date of Service: 3/6/2025 I Hospital Day: 2   Inpatient consult to Palliative Care  Consult performed by: EVELINE Rivas  Consult ordered by: Osman Girard MD        Physician Requesting Evaluation: Osman Girard MD   Reason for Evaluation / Principal Problem: goals of care and support     Assessment & Plan  Upper GI bleed  Gi consulted  EGD deferred at this time.   Sacral ulcer (HCC)  Sacral decubitis present on admission with associated abscess in the subcutaneous soft tissues measuring 6 x 6.8 x 1.8 cm.   Surgical consult, bedside debridement completed.  IR unable to place drain for abscess.  CVA (cerebral vascular accident) (HCC)  January 2025, patient with subdural and subarachnoid hemorrhage.  New left sided hemiplegia, G tube placed during 20 day admission.  Oropharyngeal dysphagia    Severe protein-calorie malnutrition (HCC)  Malnutrition Findings:   Adult Malnutrition type: Chronic illness  Adult Degree of Malnutrition: Other severe protein calorie malnutrition  Malnutrition Characteristics: Muscle loss, Weight loss     360 Statement: Malnutrition related to chronic medical conditions as evidenced by 6% weight loss in 1 month and severe muscle wasting (clavicles, temples). Treated with Enteral nutrition via PEG tube (pending EN initiation)    BMI Findings:       Body mass index is 19.54 kg/m².   Patient has PEG tube, enteral feedings continue.  ACUTE POSTHEMORRHAGIC ANEMIA    Encephalopathy acute    Palliative care patient  Palliative Diagnosis:     Admission to [facility] 1/6 thru 1/26 this year subarachnoid, subdural, CVA, Left hemiplegia with G tube placement.  Admission [facility] 2/7 thru 2/14 for pneumonia  Goals:   No limits on cares.   Spoke to patient's son Ross who confirms Level 1 full code.  Remains hopeful for the patient to improve enough to be able to socialize at  a nursing facility and play games.    Patient independent prior to January (shoveling, cleaning out gutters)  Palliative will follow for ongoing goals of care discussions as situation evolves.    Social Support:  Patient's support system: sonRoss, daughter in law Ross Garcia and federico live in [place], DIL has been local helping with patient for past 9 weeks.   Supportive listening provided  Normalized experience of patient  Advocated for patient/family with interdisciplinary team    Care Coordination  Case discussed with SLIM     Follow-up  We appreciate the opportunity to participate in this patient's care.   We will continue to follow while admitted.    Please do not hesitate to contact our on-call provider through [employer] Secure Chat or contact [phone] should there be an acute change or other symptom control concerns.    I have discussed the above management plan in detail with the primary service.     Decisional apparatus: Patient is not competent on my exam today. If competence is lost, patient's substitute decision maker would default to Ross dawn by [place] Act 169.   Advance Directive / Living Will / POLST: none on file      PDMP Review: I have reviewed the patient's controlled substance dispensing history in the Prescription Drug Monitoring Program in compliance with the [facility] regulations before prescribing any controlled substances.    History of Present Illness   HPI: Hermila Montes is a 89 y.o. year old female with recent CVA with subsequent traumatic SDH, SAH who presents with coffee ground emesis and sacral ulcer abscess as transfer from [facility].      The patient had a fairly benign medical history until having a CVA in January.  The patient then fell and had traumatic SDH, SAH.  Since then the patient has had ongoing medical issues.  Including a sacral wound, pneumonia, and significant physical and cognitive decline.     Review of Systems  Medical History Review: I have reviewed the patient's  PMH, PSH, Social History, Family History, Meds, and Allergies     Objective :  Temp:  [98.1 °F (36.7 °C)-100.2 °F (37.9 °C)] 98.4 °F (36.9 °C)  HR:  [88-96] 88  BP: ()/(53-66) 128/66  Resp:  [16-18] 16  SpO2:  [90 %-96 %] 96 %  O2 Device: None (Room air)    Physical Exam  Vitals and nursing note reviewed.   Constitutional:       General: She is not in acute distress.     Appearance: She is ill-appearing.   HENT:      Head: Normocephalic and atraumatic.   Eyes:      General: Lids are normal.      Conjunctiva/sclera: Conjunctivae normal.   Cardiovascular:      Rate and Rhythm: Normal rate and regular rhythm.      Heart sounds: No murmur heard.  Pulmonary:      Effort: Pulmonary effort is normal. No respiratory distress.      Breath sounds: Normal breath sounds.   Abdominal:      Palpations: Abdomen is soft.      Tenderness: There is no abdominal tenderness.   Musculoskeletal:         General: No swelling.      Cervical back: Neck supple.   Skin:     General: Skin is warm and dry.      Coloration: Skin is pale.   Neurological:      Mental Status: She is lethargic.      Comments: Left arm flaccidity, left leg weakness   Psychiatric:         Mood and Affect: Affect is flat.         Behavior: Behavior is slowed.         Cognition and Memory: Cognition is impaired. Memory is impaired.            Lab Results: I have reviewed the following results:  Lab Results   Component Value Date/Time    SODIUM 135 03/05/2025 02:33 AM    SODIUM 134 (L) 03/04/2025 09:57 AM    K 4.6 03/05/2025 02:33 AM    K 4.4 03/04/2025 09:57 AM    BUN 32 (H) 03/06/2025 11:21 AM    BUN 26 (H) 03/04/2025 09:57 AM    CREATININE 0.94 03/05/2025 02:33 AM    CREATININE 0.78 03/04/2025 09:57 AM    GLUC 153 (H) 03/05/2025 02:33 AM    GLUC 154 (H) 03/04/2025 09:57 AM    CALCIUM 8.6 03/05/2025 02:33 AM    CALCIUM 9.2 03/04/2025 09:57 AM    AST 13 03/05/2025 02:33 AM    AST 16 03/04/2025 09:57 AM    ALT 16 03/05/2025 02:33 AM    ALT 21 03/04/2025 09:57 AM     "ALB 2.9 (L) 03/05/2025 02:33 AM    ALB 3.2 (L) 03/04/2025 09:57 AM    TP 5.9 (L) 03/05/2025 02:33 AM    TP 6.7 03/04/2025 09:57 AM    EGFR 53 03/05/2025 02:33 AM    EGFR 71 03/04/2025 09:57 AM    EGFR 48 (L) 01/31/2020 08:55 AM     Lab Results   Component Value Date/Time    HGB 6.7 (L) 03/06/2025 09:23 AM    HGB 8.1 (L) 03/04/2025 09:01 PM    WBC 6.76 09/30/2021 10:26 AM     09/30/2021 10:26 AM    INR 1.19 03/05/2025 06:10 AM    INR 1 03/04/2025 09:57 AM    PTT 40 (H) 03/05/2025 06:10 AM     No results found for: \"[id number]\"    Imaging Results Review: I reviewed radiology reports from this admission including: CT head.  Other Study Results Review: No additional pertinent studies reviewed.    Code Status: Level 1 - Full Code  Advance Directive and Living Will:      Power of :    POLST:      Administrative Statements   I have spent a total time of 60+  minutes in caring for this patient on the day of the visit/encounter including Diagnostic results, Instructions for management, Patient and family education, Importance of tx compliance, Documenting in the medical record, Reviewing/placing orders in the medical record (including tests, medications, and/or procedures), Obtaining or reviewing history  , and Communicating with other healthcare professionals .  "

## 2025-03-06 NOTE — ASSESSMENT & PLAN NOTE
Patient has history of dysphagia. Per daughter-in-law patient was seen by speech and [facility] and was cleared for diet of purée with nectar thick liquids.  Daughter-in-law states during the days they were feeding patient food and holding tube feed and at night they were giving tube feed for supplementation.  Speech eval done: unable to tolerate PO  Will start jevity 1.2 at a low rate and increase to goal as tolerated

## 2025-03-07 ENCOUNTER — APPOINTMENT (INPATIENT)
Dept: CT IMAGING | Facility: HOSPITAL | Age: OVER 89
DRG: 356 | End: 2025-03-07
Payer: MEDICARE

## 2025-03-07 PROBLEM — N39.0 COMPLICATED UTI (URINARY TRACT INFECTION): Status: ACTIVE | Noted: 2025-03-07

## 2025-03-07 PROBLEM — G40.109 EPILEPSIA PARTIALIS CONTINUA (HCC): Status: ACTIVE | Noted: 2025-03-07

## 2025-03-07 LAB
ABO GROUP BLD BPU: NORMAL
ANION GAP SERPL CALCULATED.3IONS-SCNC: 11 MMOL/L (ref 4–13)
BASOPHILS # BLD AUTO: 0.09 THOUSANDS/ÂΜL (ref 0–0.1)
BASOPHILS NFR BLD AUTO: 1 % (ref 0–1)
BPU ID: NORMAL
BUN SERPL-MCNC: 29 MG/DL (ref 5–25)
CALCIUM SERPL-MCNC: 8.7 MG/DL (ref 8.4–10.2)
CHLORIDE SERPL-SCNC: 104 MMOL/L (ref 96–108)
CO2 SERPL-SCNC: 23 MMOL/L (ref 21–32)
CREAT SERPL-MCNC: 0.93 MG/DL (ref 0.6–1.3)
CROSSMATCH: NORMAL
EOSINOPHIL # BLD AUTO: 0.47 THOUSAND/ÂΜL (ref 0–0.61)
EOSINOPHIL NFR BLD AUTO: 3 % (ref 0–6)
ERYTHROCYTE [DISTWIDTH] IN BLOOD BY AUTOMATED COUNT: 15.9 % (ref 11.6–15.1)
GFR SERPL CREATININE-BSD FRML MDRD: 53 ML/MIN/1.73SQ M
GLUCOSE SERPL-MCNC: 128 MG/DL (ref 65–140)
GLUCOSE SERPL-MCNC: 128 MG/DL (ref 65–140)
GLUCOSE SERPL-MCNC: 138 MG/DL (ref 65–140)
GLUCOSE SERPL-MCNC: 143 MG/DL (ref 65–140)
GLUCOSE SERPL-MCNC: 153 MG/DL (ref 65–140)
GLUCOSE SERPL-MCNC: 95 MG/DL (ref 65–140)
HCT VFR BLD AUTO: 30.3 % (ref 34.8–46.1)
HGB BLD-MCNC: 10 G/DL (ref 11.5–15.4)
HGB BLD-MCNC: 9.2 G/DL (ref 11.5–15.4)
HGB BLD-MCNC: 9.2 G/DL (ref 11.5–15.4)
IMM GRANULOCYTES # BLD AUTO: 0.33 THOUSAND/UL (ref 0–0.2)
IMM GRANULOCYTES NFR BLD AUTO: 2 % (ref 0–2)
LYMPHOCYTES # BLD AUTO: 0.5 THOUSANDS/ÂΜL (ref 0.6–4.47)
LYMPHOCYTES NFR BLD AUTO: 3 % (ref 14–44)
MAGNESIUM SERPL-MCNC: 1.9 MG/DL (ref 1.9–2.7)
MCH RBC QN AUTO: 31.3 PG (ref 26.8–34.3)
MCHC RBC AUTO-ENTMCNC: 33 G/DL (ref 31.4–37.4)
MCV RBC AUTO: 95 FL (ref 82–98)
MONOCYTES # BLD AUTO: 0.87 THOUSAND/ÂΜL (ref 0.17–1.22)
MONOCYTES NFR BLD AUTO: 6 % (ref 4–12)
NEUTROPHILS # BLD AUTO: 13.4 THOUSANDS/ÂΜL (ref 1.85–7.62)
NEUTS SEG NFR BLD AUTO: 85 % (ref 43–75)
NRBC BLD AUTO-RTO: 0 /100 WBCS
PLATELET # BLD AUTO: 316 THOUSANDS/UL (ref 149–390)
PMV BLD AUTO: 9.7 FL (ref 8.9–12.7)
POTASSIUM SERPL-SCNC: 3.3 MMOL/L (ref 3.5–5.3)
RBC # BLD AUTO: 3.19 MILLION/UL (ref 3.81–5.12)
SODIUM SERPL-SCNC: 138 MMOL/L (ref 135–147)
UNIT DISPENSE STATUS: NORMAL
UNIT PRODUCT CODE: NORMAL
UNIT PRODUCT VOLUME: 350 ML
UNIT RH: NORMAL
WBC # BLD AUTO: 15.66 THOUSAND/UL (ref 4.31–10.16)

## 2025-03-07 PROCEDURE — 74150 CT ABDOMEN W/O CONTRAST: CPT

## 2025-03-07 PROCEDURE — 85025 COMPLETE CBC W/AUTO DIFF WBC: CPT | Performed by: FAMILY MEDICINE

## 2025-03-07 PROCEDURE — 80048 BASIC METABOLIC PNL TOTAL CA: CPT | Performed by: FAMILY MEDICINE

## 2025-03-07 PROCEDURE — 85018 HEMOGLOBIN: CPT

## 2025-03-07 PROCEDURE — 83735 ASSAY OF MAGNESIUM: CPT | Performed by: FAMILY MEDICINE

## 2025-03-07 PROCEDURE — 99233 SBSQ HOSP IP/OBS HIGH 50: CPT | Performed by: NURSE PRACTITIONER

## 2025-03-07 PROCEDURE — 82948 REAGENT STRIP/BLOOD GLUCOSE: CPT

## 2025-03-07 PROCEDURE — 99233 SBSQ HOSP IP/OBS HIGH 50: CPT | Performed by: FAMILY MEDICINE

## 2025-03-07 RX ORDER — VANCOMYCIN HYDROCHLORIDE 750 MG/150ML
750 INJECTION, SOLUTION INTRAVENOUS EVERY 24 HOURS
Status: DISCONTINUED | OUTPATIENT
Start: 2025-03-08 | End: 2025-03-08

## 2025-03-07 RX ORDER — POTASSIUM CHLORIDE 20MEQ/15ML
40 LIQUID (ML) ORAL ONCE
Status: COMPLETED | OUTPATIENT
Start: 2025-03-07 | End: 2025-03-08

## 2025-03-07 RX ADMIN — COLLAGENASE SANTYL: 250 OINTMENT TOPICAL at 08:38

## 2025-03-07 RX ADMIN — METRONIDAZOLE 500 MG: 500 INJECTION, SOLUTION INTRAVENOUS at 17:25

## 2025-03-07 RX ADMIN — SODIUM CHLORIDE, SODIUM GLUCONATE, SODIUM ACETATE, POTASSIUM CHLORIDE, MAGNESIUM CHLORIDE, SODIUM PHOSPHATE, DIBASIC, AND POTASSIUM PHOSPHATE 75 ML/HR: .53; .5; .37; .037; .03; .012; .00082 INJECTION, SOLUTION INTRAVENOUS at 05:15

## 2025-03-07 RX ADMIN — CEFEPIME 2000 MG: 2 INJECTION, POWDER, FOR SOLUTION INTRAVENOUS at 14:24

## 2025-03-07 RX ADMIN — PANTOPRAZOLE SODIUM 40 MG: 40 INJECTION, POWDER, FOR SOLUTION INTRAVENOUS at 14:24

## 2025-03-07 RX ADMIN — AMLODIPINE BESYLATE 10 MG: 10 TABLET ORAL at 08:39

## 2025-03-07 RX ADMIN — CEFEPIME 2000 MG: 2 INJECTION, POWDER, FOR SOLUTION INTRAVENOUS at 05:17

## 2025-03-07 RX ADMIN — VANCOMYCIN HYDROCHLORIDE 1000 MG: 1 INJECTION, SOLUTION INTRAVENOUS at 05:17

## 2025-03-07 RX ADMIN — PANTOPRAZOLE SODIUM 40 MG: 40 INJECTION, POWDER, FOR SOLUTION INTRAVENOUS at 05:15

## 2025-03-07 RX ADMIN — METRONIDAZOLE 500 MG: 500 INJECTION, SOLUTION INTRAVENOUS at 11:10

## 2025-03-07 NOTE — ASSESSMENT & PLAN NOTE
Pts POA does not want any procedures with anesthesia and as per the discussion with GI they dont want EGD  Hb dropping causing ABLA, 1 U ordered , verbal consent from DIL/POA obtainted. Hb s/p prbc is 9.2  Cont IV protonix  Cont to check H&H q12H

## 2025-03-07 NOTE — ASSESSMENT & PLAN NOTE
January 2025, patient with subdural and subarachnoid hemorrhage.  New left sided hemiplegia, G tube placed during 20 day admission.  Patient now off seizure medications.

## 2025-03-07 NOTE — ASSESSMENT & PLAN NOTE
- Imaging revealed sacral decubitus ulcer on the left with associated abscess and subcutaneous soft tissue measuring 6 x 6.8 x 1.8 cm.  - Wound care and general surgery following

## 2025-03-07 NOTE — CASE MANAGEMENT
Case Management Discharge Planning Note    Patient name Hermila Montes  Location /-01 MRN [id number]  : 1933 Date 3/7/2025       Current Admission Date: 3/4/2025  Current Admission Diagnosis:Upper GI bleed   Patient Active Problem List    Diagnosis Date Noted Date Diagnosed    Complicated UTI (urinary tract infection) 2025     Encephalopathy acute 2025     Palliative care patient 2025     Sacral ulcer (HCC) 2025     Upper GI bleed 2025     CVA (cerebral vascular accident) (HCC) 2025     Oropharyngeal dysphagia 2025     Severe protein-calorie malnutrition (HCC) 2025     ACUTE POSTHEMORRHAGIC ANEMIA 2025       LOS (days): 3  Geometric Mean LOS (GMLOS) (days): 2.9  Days to GMLOS:0.5     OBJECTIVE:  Risk of Unplanned Readmission Score: 8.95         Current admission status: Inpatient   Preferred Pharmacy:   [facility] - [facility] - [id number]  [facility] [address]  [id number]  [place] [address]  Phone: [phone] Fax: [phone]    Primary Care Provider: Everardo Leach MD    Primary Insurance: MEDICARE  Secondary Insurance:     DISCHARGE DETAILS:    Other Referral/Resources/Interventions Provided:  Interventions: SNF  Referral Comments: YUN called [facility] at [phone] and spoke to Blake. YUN reported that MILENA is requesting medical records for this patient's stay at [facility]. Blake reported that YUN will need to send her a fax cover sheet and identifiy what is being requested. YUN completed the fax cover sheet as directed and faxed to Blake at [phone].

## 2025-03-07 NOTE — PLAN OF CARE
Problem: DISCHARGE PLANNING  Goal: Discharge to home or other facility with appropriate resources  Description: INTERVENTIONS:  - Identify barriers to discharge w/patient and caregiver  - Arrange for needed discharge resources and transportation as appropriate  - Identify discharge learning needs (meds, wound care, etc.)  - Arrange for interpretive services to assist at discharge as needed  - Refer to Case Management Department for coordinating discharge planning if the patient needs post-hospital services based on physician/advanced practitioner order or complex needs related to functional status, cognitive ability, or social support system  Outcome: Not Progressing     Problem: Prexisting or High Potential for Compromised Skin Integrity  Goal: Skin integrity is maintained or improved  Description: INTERVENTIONS:  - Identify patients at risk for skin breakdown  - Assess and monitor skin integrity  - Assess and monitor nutrition and hydration status  - Monitor labs   - Assess for incontinence   - Turn and reposition patient  - Assist with mobility/ambulation  - Relieve pressure over bony prominences  - Avoid friction and shearing  - Provide appropriate hygiene as needed including keeping skin clean and dry  - Evaluate need for skin moisturizer/barrier cream  - Collaborate with interdisciplinary team   - Patient/family teaching  - Consider wound care consult   Outcome: Not Progressing     Problem: Nutrition/Hydration-ADULT  Goal: Nutrient/Hydration intake appropriate for improving, restoring or maintaining nutritional needs  Description: Monitor and assess patient's nutrition/hydration status for malnutrition. Collaborate with interdisciplinary team and initiate plan and interventions as ordered.  Monitor patient's weight and dietary intake as ordered or per policy. Utilize nutrition screening tool and intervene as necessary. Determine patient's food preferences and provide high-protein, high-caloric foods as  appropriate.     INTERVENTIONS:  - Monitor oral intake, urinary output, labs, and treatment plans  - Assess nutrition and hydration status and recommend course of action  - Evaluate amount of meals eaten  - Assist patient with eating if necessary   - Allow adequate time for meals  - Recommend/ encourage appropriate diets, oral nutritional supplements, and vitamin/mineral supplements  - Order, calculate, and assess calorie counts as needed  - Recommend, monitor, and adjust tube feedings and TPN/PPN based on assessed needs  - Assess need for intravenous fluids  - Provide specific nutrition/hydration education as appropriate  - Include patient/family/caregiver in decisions related to nutrition  Outcome: Not Progressing     Problem: DISCHARGE PLANNING  Goal: Discharge to home or other facility with appropriate resources  Description: INTERVENTIONS:  - Identify barriers to discharge w/patient and caregiver  - Arrange for needed discharge resources and transportation as appropriate  - Identify discharge learning needs (meds, wound care, etc.)  - Arrange for interpretive services to assist at discharge as needed  - Refer to Case Management Department for coordinating discharge planning if the patient needs post-hospital services based on physician/advanced practitioner order or complex needs related to functional status, cognitive ability, or social support system  Outcome: Not Progressing     Problem: Prexisting or High Potential for Compromised Skin Integrity  Goal: Skin integrity is maintained or improved  Description: INTERVENTIONS:  - Identify patients at risk for skin breakdown  - Assess and monitor skin integrity  - Assess and monitor nutrition and hydration status  - Monitor labs   - Assess for incontinence   - Turn and reposition patient  - Assist with mobility/ambulation  - Relieve pressure over bony prominences  - Avoid friction and shearing  - Provide appropriate hygiene as needed including keeping skin clean  and dry  - Evaluate need for skin moisturizer/barrier cream  - Collaborate with interdisciplinary team   - Patient/family teaching  - Consider wound care consult   Outcome: Not Progressing

## 2025-03-07 NOTE — ASSESSMENT & PLAN NOTE
"- In 1/2025, patient had SDH, SAH, IVH, and R hemispheric watershed strokes,  During that admission, she had LUE \"twitching.\"  Routine EEG was inconclusive due to muscle artifact.  There was high suspicion for EPC.  - Patient was started on Keppra 500 mg BID and Trileptal 150 mg BID which controlled the twitching  - Per [facility] notes, patient's AEDs are being weaned, however, no prior Neurology documentation stating that AEDs should be weaned.    When she was discharged from [facility] in 1/2025 and 2/2025, the AED doses were unchanged.    However, when she was transferred to [facility] on 3/4/2025, she was discharged on Keppra 250 mg QAM/500 mg QHS and Trileptal 228 mg QHS.  (Unclear when AEDs were adjusted) ***    Plan:  - Continue Keppra 500 mg BID ***  Currently receiving 250 mg QAM and 500 mg QHS; need to clarify dosing with family  - Continue home Trileptal 150 mg BID  Since being admitted, patient has not received any of the Trileptal due to family refusing, stating that it was weaned off***  Per discharge summary from [facility] on 3/4/2025, patient was on Trileptal 228 mg QHS (unclear where this dosing came from) ***  - Monitor for seizure activity  "

## 2025-03-07 NOTE — ASSESSMENT & PLAN NOTE
1 U PRBC ordered, hb 9.2 now  H&H Q12H  Verbal consent from DIL obtained      Sacral pressure ulcer, stage 4 POA due to decreased mobility in the setting of recent stroke a/e/b left sacral wound measuring 6x6.8x1.8cm with abscess treated with wound care consultation, IR consultation, Q2 hour turn and reposition, offload pressure, evaluate for pressure reduction mattress, registered dietitian evaluation to optimize protein/calorie intake for wound healing.

## 2025-03-07 NOTE — ASSESSMENT & PLAN NOTE
89 y.o. 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  Family ultimately requested transfer to [facility] for further management.    Workup:  CTA abdomen/pelvis on 3/4/2025 at [facility]:  No active GI bleed.    Sacral decubitus ulcer on the left with associated abscess and subcutaneous soft tissue measuring 6 x 6.8 x 1.8 cm.    Possible esophagitis   Small bilateral pleural effusions with basilar atelectasis.  CTH 3/5/2025:  Subacute to chronic infarct in the right posterior parietal region with possible residual subacute to chronic subarachnoid hemorrhage.    No acute intracranial hemorrhage.    Chronic lacunar infarcts in the centrum semiovale.      Labs with leukocytosis, hypokalemia, UTI (E fecalis and pseudomonas), and severe anemia (hemoglobin as low as 6.7 on 3/6/2025) requiring blood transfusions.    Neurology was consulted for ***  Please refer to HPI for further details regarding [facility] hospitalization in 1/2025.    Plan:  ***  - Resume home aspirin 81 mg daily when cleared by GI  - ?statin (previously deferred by [facility] Neurology due to LDL 78)***  - ?Carotid ultrasound vs CTA head/neck ***? (Prior MRA head/neck from 1/8/2025 showed short segment severe narrowing of the proximal R cervical ICA)  - Frequent neuro checks. Continue to monitor and notify neurology with any changes.   -  Medical management and supportive care per primary team. Correction of any metabolic or infectious disturbances

## 2025-03-07 NOTE — WOUND OSTOMY CARE
Progress Note - Wound   Hermila Montes 89 y.o. female MRN: [id number]  Unit/Bed#: -01 Encounter: [id number]        Spoke with patient's daughter in law regarding plan of care/treatment plan for the sacral wound. Updated daughter-in-law on treatment plan and that all instructions are placed in the discharge paperwork. All questions and concerns answered at this time.          Contact through [employer] Secure Chat with any questions  Wound Care will continue to follow while inpatient    Migdalia TOLBERT RN CWON  Wound and Ostomy care

## 2025-03-07 NOTE — ASSESSMENT & PLAN NOTE
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  Daughter-in-law says patient has been doing physical therapy in which she has improved with movement. Patient is currently bedbound, with flaccid left arm. Patient also has expressive aphasia, daughter-in-law at 1 point patient was nonverbal but is now able to say a few words here and there.  Was started on AED keppra and trileptal at [facility] and since then it is being gradually weaned.  Pts DIL/family have refused the medication administration and has not received   Turn and reposition.  Specialty bed ordered.  PT and OT evaluation and treat.

## 2025-03-07 NOTE — ASSESSMENT & PLAN NOTE
Gradually getting closer to baseline  Metabolic from sacral infection vs toxic from antiseizure meds vs worsening baseline cognition s/p CVA and recent COVID vs delirum  Plan to wean off gradually of anti seizure meds   Poor prognosis  Palliative consulted

## 2025-03-07 NOTE — PROGRESS NOTES
Progress Note - Palliative Care   Name: Hermila Montes 89 y.o. female I MRN: [id number]  Unit/Bed#: -01 I Date of Admission: 3/4/2025   Date of Service: 3/7/2025 I Hospital Day: 3    Assessment & Plan  Upper GI bleed  Gi consulted  EGD deferred at this time.   Hgb stable   Sacral ulcer (HCC)  Sacral decubitis present on admission with associated abscess in the subcutaneous soft tissues measuring 6 x 6.8 x 1.8 cm.   Surgical consult, bedside debridement completed.  IR unable to place drain for abscess.  CVA (cerebral vascular accident) (HCC)  January 2025, patient with subdural and subarachnoid hemorrhage.  New left sided hemiplegia, G tube placed during 20 day admission.  Patient now off seizure medications.   Severe protein-calorie malnutrition (HCC)  Malnutrition Findings:   Adult Malnutrition type: Chronic illness  Adult Degree of Malnutrition: Other severe protein calorie malnutrition  Malnutrition Characteristics: Muscle loss, Weight loss     360 Statement: Malnutrition related to chronic medical conditions as evidenced by 6% weight loss in 1 month and severe muscle wasting (clavicles, temples). Treated with Enteral nutrition via PEG tube (pending EN initiation)    BMI Findings:       Body mass index is 19.54 kg/m².   Patient has PEG tube, enteral feedings continue.  Encephalopathy acute    Palliative care patient  Palliative Diagnosis:     Admission to [facility] 1/6 thru 1/26 this year subarachnoid, subdural, CVA, Left hemiplegia with G tube placement.  Admission [facility] 2/7 thru 2/14 for pneumonia  Goals:   No limits on cares.   Spoke to patient's son Ross who confirms Level 1 full code.  Remains hopeful for the patient to improve enough to be able to socialize at a nursing facility and play games.    Patient independent prior to January (shoveling, cleaning out gutters)  Palliative will follow for ongoing goals of care discussions as situation evolves.    Social Support:  Patient's support system: sonRoss,  daughter in law Ryan Garcia live in [place], DIL has been local helping with patient for past 9 weeks.   Supportive listening provided  Normalized experience of patient  Advocated for patient/family with interdisciplinary team    Care Coordination  Case discussed with [facility]     Follow-up  We appreciate the opportunity to participate in this patient's care.   We will continue to follow while admitted.    Please do not hesitate to contact our on-call provider through [employer] Secure Chat or contact [phone] should there be an acute change or other symptom control concerns.    Complicated UTI (urinary tract infection)      Decisional apparatus: Patient is not competent on my exam today. If competence is lost, patient's substitute decision maker would default to son by [place] Act 169.     PDMP Review: I have reviewed the patient's controlled substance dispensing history in the Prescription Drug Monitoring Program in compliance with the [facility] regulations before prescribing any controlled substances.    Subjective    Patient more alert on assessment today.  Does not track or engage.      Objective :  Temp:  [98.3 °F (36.8 °C)-98.5 °F (36.9 °C)] 98.5 °F (36.9 °C)  HR:  [] 97  BP: (140-159)/() 151/116  Resp:  [17] 17  SpO2:  [94 %-96 %] 96 %  O2 Device: None (Room air)    Physical Exam  Constitutional:       Appearance: She is ill-appearing.      Comments: Calling out, incomprehensible speech     Pulmonary:      Effort: No prolonged expiration, respiratory distress or retractions.   Neurological:      Mental Status: She is alert.   Psychiatric:         Attention and Perception: She is inattentive.         Speech: She is noncommunicative.            Lab Results: I have reviewed the following results:  Lab Results   Component Value Date/Time    SODIUM 138 03/07/2025 09:12 AM    SODIUM 134 (L) 03/04/2025 09:57 AM    K 3.3 (L) 03/07/2025 09:12 AM    K 4.4 03/04/2025 09:57 AM    BUN 29 (H) 03/07/2025 09:12  "AM    BUN 26 (H) 03/04/2025 09:57 AM    CREATININE 0.93 03/07/2025 09:12 AM    CREATININE 0.78 03/04/2025 09:57 AM    GLUC 143 (H) 03/07/2025 09:12 AM    GLUC 154 (H) 03/04/2025 09:57 AM    CALCIUM 8.7 03/07/2025 09:12 AM    CALCIUM 9.2 03/04/2025 09:57 AM    AST 13 03/05/2025 02:33 AM    AST 16 03/04/2025 09:57 AM    ALT 16 03/05/2025 02:33 AM    ALT 21 03/04/2025 09:57 AM    ALB 2.9 (L) 03/05/2025 02:33 AM    ALB 3.2 (L) 03/04/2025 09:57 AM    TP 5.9 (L) 03/05/2025 02:33 AM    TP 6.7 03/04/2025 09:57 AM    EGFR 53 03/07/2025 09:12 AM    EGFR 71 03/04/2025 09:57 AM    EGFR 48 (L) 01/31/2020 08:55 AM     Lab Results   Component Value Date/Time    HGB 10.0 (L) 03/07/2025 09:12 AM    HGB 8.1 (L) 03/04/2025 09:01 PM    WBC 15.66 (H) 03/07/2025 09:12 AM     03/07/2025 09:12 AM    INR 1.19 03/05/2025 06:10 AM    INR 1 03/04/2025 09:57 AM    PTT 40 (H) 03/05/2025 06:10 AM     No results found for: \"[id number]\"    Code Status: Level 1 - Full Code  Advance Directive and Living Will:      Power of :    POLST:      Administrative Statements   I have spent a total time of 25+  minutes in caring for this patient on the day of the visit/encounter including Diagnostic results, Prognosis, Risks and benefits of tx options, Instructions for management, Importance of tx compliance, Documenting in the medical record, Reviewing/placing orders in the medical record (including tests, medications, and/or procedures), Obtaining or reviewing history  , and Communicating with other healthcare professionals .  "

## 2025-03-07 NOTE — ASSESSMENT & PLAN NOTE
Palliative Diagnosis:     Admission to [facility] 1/6 thru 1/26 this year subarachnoid, subdural, CVA, Left hemiplegia with G tube placement.  Admission [facility] 2/7 thru 2/14 for pneumonia  Goals:   No limits on cares.   Spoke to patient's son Ross who confirms Level 1 full code.  Remains hopeful for the patient to improve enough to be able to socialize at a nursing facility and play games.    Patient independent prior to January (shoveling, cleaning out gutters)  Palliative will follow for ongoing goals of care discussions as situation evolves.    Social Support:  Patient's support system: son, Ross, daughter in law Radha,   Ryan live in [place], DIL has been local helping with patient for past 9 weeks.   Supportive listening provided  Normalized experience of patient  Advocated for patient/family with interdisciplinary team    Care Coordination  Case discussed with [facility]     Follow-up  We appreciate the opportunity to participate in this patient's care.   We will continue to follow while admitted.    Please do not hesitate to contact our on-call provider through [employer] Secure Chat or contact [phone] should there be an acute change or other symptom control concerns.

## 2025-03-07 NOTE — CONSULTS
Consultation - Neurology   Name: Hermila Montes 89 y.o. female I MRN: [id number]  Unit/Bed#: -01 I Date of Admission: 3/4/2025   Date of Service: 3/7/2025 I Hospital Day: 3   Consults  Physician Requesting Evaluation: Osman Girard MD   Reason for Evaluation / Principal Problem: ***  { ?Quick Links I Problem List I PORCH I Billing Tip:43465}  Assessment & Plan  CVA (cerebral vascular accident) (HCC)  89 y.o. female with recent SAH/SDH/IVH (1/2025) in the setting of unwitnessed fall (1/2025), R hemispheric watershed stroke (1/2025) likely due to hypoperfusion from short segment severe stenosis in the R cervical ICA with residual L hemiplegia/neglect and expressive aphasia, EPC with LUE twitching (1/2025) on Keppra and Trileptal, C7-T11 extra-axial hematoma with cord edema (1/2025; no surgical intervention was needed), dysphagia s/p PEG placement, chronic indwelling Lawson catheter, HTN, bed-bound, and recent pneumonia/UTI/COVID infection (2/2025) who initially presented to [facility] on 3/4/2025 from nursing facility due to fever, coffee-ground emesis, and sacral ulcer infection with abscess.  Family ultimately requested transfer to [facility] for further management.    Workup:  CTA abdomen/pelvis on 3/4/2025 at [facility]:  No active GI bleed.    Sacral decubitus ulcer on the left with associated abscess and subcutaneous soft tissue measuring 6 x 6.8 x 1.8 cm.    Possible esophagitis   Small bilateral pleural effusions with basilar atelectasis.  CTH 3/5/2025:  Subacute to chronic infarct in the right posterior parietal region with possible residual subacute to chronic subarachnoid hemorrhage.    No acute intracranial hemorrhage.    Chronic lacunar infarcts in the centrum semiovale.      Labs with leukocytosis, hypokalemia, UTI (E fecalis and pseudomonas), and severe anemia (hemoglobin as low as 6.7 on 3/6/2025) requiring blood transfusions.    Neurology was consulted for ***  Please refer to HPI for further details regarding  "[facility] hospitalization in 1/2025.    Plan:  ***  - Resume home aspirin 81 mg daily when cleared by GI  - ?statin (previously deferred by [facility] Neurology due to LDL 78)***  - ?Carotid ultrasound vs CTA head/neck ***? (Prior MRA head/neck from 1/8/2025 showed short segment severe narrowing of the proximal R cervical ICA)  - Frequent neuro checks. Continue to monitor and notify neurology with any changes.   - Medical management and supportive care per primary team. Correction of any metabolic or infectious disturbances  History of epilepsia partialis continua (HCC)  - In 1/2025, patient had SDH, SAH, IVH, and R hemispheric watershed strokes,  During that admission, she had LUE \"twitching.\"  Routine EEG was inconclusive due to muscle artifact.  There was high suspicion for EPC.  - Patient was started on Keppra 500 mg BID and Trileptal 150 mg BID which controlled the twitching  - Per [facility] notes, patient's AEDs are being weaned, however, no prior Neurology documentation stating that AEDs should be weaned.    When she was discharged from [facility] in 1/2025 and 2/2025, the AED doses were unchanged.    However, when she was transferred to [facility] on 3/4/2025, she was discharged on Keppra 250 mg QAM/500 mg QHS and Trileptal 228 mg QHS.  (Unclear when AEDs were adjusted) ***    Plan:  - Continue Keppra 500 mg BID ***  Currently receiving 250 mg QAM and 500 mg QHS; need to clarify dosing with family  - Continue home Trileptal 150 mg BID  Since being admitted, patient has not received any of the Trileptal due to family refusing, stating that it was weaned off***  Per discharge summary from [facility] on 3/4/2025, patient was on Trileptal 228 mg QHS (unclear where this dosing came from) ***  - Monitor for seizure activity  Encephalopathy acute  - Likely multifactorial due to GI bleed/anemia, UTI, sacral ulcer wound, recent COVID infection, and poor cognitive reserve  - Avoid cefepime if able  - Conservative management of delirium: minimize " noise, maintain day/night cycle, provide frequent redirection, avoid restraints if possible, etc.  Upper GI bleed  - GI following  - Patient's POA does not want any procedures with anesthesia  - Hemoglobin dropped as low as 6.7 s/p blood transfusion  Sacral ulcer (HCC)  - Imaging revealed sacral decubitus ulcer on the left with associated abscess and subcutaneous soft tissue measuring 6 x 6.8 x 1.8 cm.  - Wound care and general surgery following  Complicated UTI (urinary tract infection)  - Chronic Lawson  - Urine culture positive for E fecalis and pseudomonas  - Currently on cefepime and vancomycin  {MDM/Admin Statements (Optional):17047}    {Neurology Follow Up:39265}    History of Present Illness   Hermila Montes is a 89 y.o. female with recent SAH/SDH/IVH (1/2025) in the setting of fall (1/2025), R hemispheric watershed stroke (1/2025) likely due to hypoperfusion from short segment severe stenosis in the R cervical ICA with residual L hemiplegia/neglect and expressive aphasia, EPC (1/2025) on Keppra and Trileptal, C7-T11 extra-axial hematoma with cord edema (1/2025; no surgical intervention needed), dysphagia s/p PEG placement, chronic indwelling Lawson catheter, HTN, bed-bound, and recent COVID infection (2/2025) who presented to [facility] on 3/4/2025 from nursing facility due to fever, coffee-ground emesis, and sacral ulcer infection.  Family ultimately requested transfer to [facility] for further management.    Patient has had multiple recent admissions at [facility].  On 1/6/2025, patient presented as a trauma alert after a fall and being found down unresponsive next to her bed for unknown period of time.  N was 2 days prior.  When she arrived, she was found to have left-sided weakness/neglect and was nonverbal, but able to follow commands on the right.  Workup revealed SDH along the right cerebellar tentorium, SAH posterior to the clivus, IVH, and NEGRO.  MRI brain revealed watershed appearing infarcts in the right  "cerebral hemisphere suggestive of hypoperfusion from the short segment severe stenosis in the R cervical ICA.  It was unclear if strokes occurred as a result of being down and hypotensive versus the stroke causing the fall/trauma.  Patient was started on aspirin 81 mg daily.  Statin was held due to LDL 78.  Hospital course further complicated by LUE \"twitching\" and concern for EPC, so patient was started on Trileptal 150 mg BID and Keppra 500 mg BID.  Routine EEG was unreadable due to muscle artifact.  She was also found to have extra-axial hematoma causing mass effect on the ventral left aspect of the cord from C7-T11 and T2 hyperintense signal within the spinal cord from T9-T10 to mid T11 suggesting cord edema.  She was evaluated by Neurosurgery, but no surgical intervention was required.  She had persistent dysphagia and family elected for PEG placement.  She was found to have aspiration pneumonia and completed a course of broad-spectrum antibiotics.  On 2/7/2025, patient presented after being found unresponsive and \"feverish\" with SpO2 71%.  She was found to have UTI, aspiration pneumonia, and was COVID-positive.  She received empiric antibiotics and dexamethasone.  On 3/4/2025, patient presented from nursing home for fever, coffee-ground emesis, and sacral ulcer infection.  CTA abdomen/pelvis negative for active GI bleed.  Did note sacral decubitus ulcer on the left with associated abscess and subcutaneous soft tissue measuring 6 x 6.8 x 1.8 cm.  Also noted possible esophagitis and small bilateral pleural effusions with basilar atelectasis.  Patient's family ultimately requested transfer to [facility] for further management.    Since arriving to [facility], patient has been afebrile.  CTH revealed subacute to chronic infarct in the right posterior parietal region with possible residual subacute to chronic subarachnoid hemorrhage.  No acute intracranial hemorrhage.  Also showed chronic lacunar infarcts in the " centrum semiovale.  Labs with leukocytosis, hypokalemia, UTI (E fecalis and pseudomonas), and severe anemia (hemoglobin as low as 6.7 on 3/6/2025) requiring blood transfusions.  Patient was encephalopathic, but reportedly has been improving since being admitted.  She has been maintained on cefepime and vancomycin for UTI.  Neurology was consulted to help manage AEDs.    On exam today, ***    Review of Systems     Medical History Review: I have reviewed the patient's PMH, PSH, Social History, Family History, Meds, and Allergies   Historical Information   Past Medical History:   Diagnosis Date    Hypertension      Past Surgical History:   Procedure Laterality Date    APPENDECTOMY      CYST REMOVAL Right      Social History     Tobacco Use    Smoking status: Former    Smokeless tobacco: Never   Substance and Sexual Activity    Alcohol use: Never    Drug use: Never    Sexual activity: Not on file     E-Cigarette/Vaping     E-Cigarette/Vaping Substances     History reviewed. No pertinent family history.  Social History     Tobacco Use    Smoking status: Former    Smokeless tobacco: Never   Substance and Sexual Activity    Alcohol use: Never    Drug use: Never    Sexual activity: Not on file       Current Facility-Administered Medications:     albuterol (PROVENTIL HFA,VENTOLIN HFA) inhaler 2 puff, Q4H PRN    amLODIPine (NORVASC) tablet 10 mg, Daily    cefepime (MAXIPIME) 2 g/50 mL dextrose IVPB, Q12H, Last Rate: 2,000 mg (03/07/25 0517)    collagenase (SANTYL) ointment, Daily    insulin lispro (HumALOG/ADMELOG) 100 units/mL subcutaneous injection 1-5 Units, Q6H NURIA **AND** Fingerstick Glucose (POCT), Q6H    levETIRAcetam (KEPPRA) injection 250 mg, Daily With Breakfast **AND** levETIRAcetam (KEPPRA) injection 500 mg, HS    metroNIDAZOLE (FLAGYL) IVPB (premix) 500 mg 100 mL, Q8H, Last Rate: Stopped (03/07/25 0518)    multi-electrolyte (Plasmalyte-A/Isolyte-S PH 7.4/Normosol-R) IV solution, Continuous, Last Rate: 75 mL/hr  (03/07/25 0515)    OXcarbazepine (TRILEPTAL) oral suspension 150 mg, BID    pantoprazole (PROTONIX) injection 40 mg, Q12H    vancomycin (VANCOCIN) IVPB (premix in dextrose) 1,000 mg 200 mL, Q24H, Last Rate: 1,000 mg (03/07/25 0517)  Prior to Admission Medications   Prescriptions Last Dose Informant Patient Reported? Taking?   Ascorbic Acid, Vitamin C, (VITAMIN C) 100 MG tablet Not Taking  Yes No   Sig: Take 1 tablet by mouth in the morning   Patient not taking: Reported on 3/5/2025   hydrochlorothiazide (HYDRODIURIL) 12.5 mg tablet Not Taking  Yes No   Sig: Take 12.5 mg by mouth in the morning   Patient not taking: Reported on 3/5/2025   lisinopril (ZESTRIL) 20 mg tablet Not Taking  Yes No   Sig: Take 20 mg by mouth 2 (two) times a day   Patient not taking: Reported on 3/5/2025      Facility-Administered Medications: None     Ammonia    Objective :{?Quick Links I ICU Summary I Vitals I I/Os I LDAs I Mobility (PT/OT) I Code Status / ACP   ?Quick Links I Active Meds I Pain Meds I Antibiotics I Anticoagulants:13167}  Temp:  [98.3 °F (36.8 °C)-98.6 °F (37 °C)] 98.3 °F (36.8 °C)  HR:  [] 103  BP: (126-159)/(60-85) 159/85  Resp:  [16-18] 17  SpO2:  [93 %-96 %] 94 %  O2 Device: None (Room air)    Physical ExamNeurological Exam    {?Quick Links I Lab Review I Micro Results I Radiology I Cardiology:59538}  Lab Results: I have reviewed the following results:I have personally reviewed pertinent reports.  , CBC:   Results from last 7 days   Lab Units 03/07/25  0912 03/07/25  0059 03/06/25  1725 03/06/25  0923 03/05/25  0825 03/04/25  2101   WBC Thousand/uL 15.66*  --   --   --   --   --    RBC Million/uL 3.19*  --   --   --   --   --    HEMOGLOBIN g/dL 10.0* 9.2* 9.2* 6.7*   < > 8.1*   HEMATOCRIT % 30.3*  --   --  20.9*  --  23.3*   MCV fL 95  --   --   --   --   --    PLATELETS Thousands/uL 316  --   --   --   --   --     < > = values in this interval not displayed.   , BMP/CMP:   Results from last 7 days   Lab Units  "03/07/25  0912 03/06/25  1121 03/05/25  0233 03/04/25  0957   SODIUM mmol/L 138  --  135 134*   POTASSIUM mmol/L 3.3*  --  4.6 4.4   CHLORIDE mmol/L 104  --  101 98*   CO2 mmol/L 23  --  28 29   BUN mg/dL 29* 32* 30* 26*   CREATININE mg/dL 0.93  --  0.94 0.78   CALCIUM mg/dL 8.7  --  8.6 9.2   AST U/L  --   --  13 16   ALT U/L  --   --  16 21   ALK PHOS U/L  --   --  63 83   EGFR ml/min/1.73sq m 53  --  53 71   , Vitamin B12:   , HgBA1C:   , TSH:   , Coagulation:   Results from last 7 days   Lab Units 03/05/25  0610 03/04/25  0957   PROTIME sec  --  13.2   INR  1.19 1   APTT sec  --  34.3   , Lipid Profile:   , Ammonia:   , Urinalysis:   Results from last 7 days   Lab Units 03/05/25  0458   COLOR UA  Yellow   CLARITY UA  Turbid   SPEC GRAV UA  >=1.050*   PH UA  6.0   LEUKOCYTES UA  Large*   NITRITE UA  Negative   GLUCOSE UA mg/dl Negative   KETONES UA mg/dl Trace*   BILIRUBIN UA  Negative   BLOOD UA  Negative   , Drug Screen:   , Medication Drug Levels:       Invalid input(s): \"CARBAMAZEPINE\", \"OXCARBAZEPINE\"  Recent Labs     03/05/25  0233 03/05/25  0825 03/06/25  0923 03/06/25  1121 03/06/25  1725 03/07/25  0059   HGB  --    < > 6.7*  --    < > 9.2*   HCT  --   --  20.9*  --   --   --    SODIUM 135  --   --   --   --   --    K 4.6  --   --   --   --   --      --   --   --   --   --    CO2 28  --   --   --   --   --    BUN 30*  --   --  32*  --   --    CREATININE 0.94  --   --   --   --   --    GLUC 153*  --   --   --   --   --    MG 1.8*  --   --   --   --   --    PHOS 4.3*  --   --   --   --   --     < > = values in this interval not displayed.     Imaging Results Review: I reviewed radiology reports from this admission including: CT head.  Other Study Results Review: Other studies reviewed include: Also reviewed prior MRI brain, MRI C/T spine, prior CT head, prior Routine EEG from admission in 1/2025.    VTE Prophylaxis: VTE covered by:    None    Due to GI bleed and   Sequential compression device " (Venodyne)

## 2025-03-07 NOTE — NURSING NOTE
Patient very lethargic, unable to verbalize, does not answer any questions, NPO, speech bedside to evaluate the patient.  Patient unable to swallow, does not coordinate movement to swallow, remains NPO.  Held all oral medication until they can be changed to per PEG, Patient on IV Keppra however the dosage is questionable as she is being weaned off, waiting for reply from the nursing home on current status and exact dosage.  Holding Keppra until we have clearer answer.  Patient will start PEG tube feeding later. Case discussed with attending and GI.

## 2025-03-07 NOTE — CASE MANAGEMENT
Case Management Progress Note    Patient name Hermila Montes  Location /-01 MRN [id number]  : 1933 Date 3/7/2025       LOS (days): 3  Geometric Mean LOS (GMLOS) (days): 2.9  Days to GMLOS:0.3        OBJECTIVE:        Current admission status: Inpatient  Preferred Pharmacy:   [facility] - [facility] - [id number]  [place] [address]  Phone: [phone] Fax: [phone]    Primary Care Provider: Everardo Leach MD    Primary Insurance: MEDICARE  Secondary Insurance:     PROGRESS NOTE:    CM called [facility] again and spoke to Wil who reported that she received the fax request. She gave CM the dosing via phone, and CM sent to [facility]. Official fax to be sent shortly, per Wil. Receiving fax is [phone], located on [facility] RN station. [facility] made aware.

## 2025-03-07 NOTE — ASSESSMENT & PLAN NOTE
- Chronic Lawson  - Urine culture positive for E fecalis and pseudomonas  - Currently on cefepime and vancomycin

## 2025-03-07 NOTE — PROGRESS NOTES
Progress Note - Hospitalist   Name: Hermila Montes 89 y.o. female I MRN: [id number]  Unit/Bed#: -01 I Date of Admission: 3/4/2025   Date of Service: 3/7/2025 I Hospital Day: 3    Assessment & Plan  Upper GI bleed  Pts POA does not want any procedures with anesthesia and as per the discussion with GI they dont want EGD  Hb dropping causing ABLA, 1 U ordered , verbal consent from DIL/POA obtainted. Hb s/p prbc is 9.2  Cont IV protonix  Cont to check H&H q12H  Encephalopathy acute  Gradually getting closer to baseline  Metabolic from sacral infection vs toxic from antiseizure meds vs worsening baseline cognition s/p CVA and recent COVID vs delirum  Plan to wean off gradually of anti seizure meds   Poor prognosis  Palliative consulted  Oropharyngeal dysphagia  Patient has history of dysphagia. Per daughter-in-law [facility] and was cleared for diet of purée with nectar thick liquids.  Daughter-in-law states during the days they were feeding patient food and holding tube feed and at night they were giving tube feed for supplementation.  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  Daughter-in-law says patient has been doing physical therapy in which she has improved with movement. Patient is currently bedbound, with flaccid left arm. Patient also has expressive aphasia, daughter-in-law at 1 point patient was nonverbal but is now able to say a few words here and there.  Was started on AED keppra and trileptal at [facility] and since then it is being gradually weaned.  Pts DIL/family have refused the medication administration and has not received   Turn and reposition.  Specialty bed ordered.  PT and OT evaluation and treat.  Sacral ulcer (HCC)  Per daughter-in-law patient has developed sacral ulcer after being diagnosed with CVA.  Patient  has had debridement done recently and multiple treatment changes for the care of the wound.  CT abdomen pelvis-3/4/2025-No active GI bleed is identified. Sacral decubitus ulcer on the left with associated abscess in the subcutaneous soft tissues measuring 6 x 6.8 x 1.8 cm. Patulous partially fluid-filled esophagus with wall thickening. Correlate for esophagitis. Small bilateral pleural effusions with basilar atelectasis.   Wound care consulted.  IR consult appreciated: no indication for drain as per recs  General surgery consulted and debridement done at beside. Appreciate recommendations  Severe protein-calorie malnutrition (HCC)  Malnutrition Findings:   Adult Malnutrition type: Chronic illness  Adult Degree of Malnutrition: Other severe protein calorie malnutrition  Malnutrition Characteristics: Muscle loss, Weight loss              360 Statement: Malnutrition related to chronic medical conditions as evidenced by 6% weight loss in 1 month and severe muscle wasting (clavicles, temples). Treated with Enteral nutrition via PEG tube (pending EN initiation)    BMI Findings:        Body mass index is 19.54 kg/m².     Starting tube feeds  Severe protein-calorie malnutrition POA duet to increased nutritional needs in the setting of acute illness  and wound healing a/e/b 6% weight loss in 1 month and severe muscle wasting (clavicles, temples) treated with Enteral nutrition and ongoing registered nutritionist evaluation for optimal protein/calorie intake.      ACUTE POSTHEMORRHAGIC ANEMIA  1 U PRBC ordered, hb 9.2 now  H&H Q12H  Verbal consent from DIL obtained      Sacral pressure ulcer, stage 4 POA due to decreased mobility in the setting of recent stroke a/e/b left sacral wound measuring 6x6.8x1.8cm with abscess treated with wound care consultation, IR consultation, Q2 hour turn and reposition, offload pressure, evaluate for pressure reduction mattress, registered dietitian evaluation to optimize protein/calorie intake  for wound healing.   Palliative care patient  [facility] had with family, continue full code  Complicated UTI (urinary tract infection)  Lawson present  E fecalis and pseudomonas (+) in urine   Continue cefepime and vancomycin    VTE Pharmacologic Prophylaxis: VTE Score: 5 High Risk (Score >/= 5) - Pharmacological DVT Prophylaxis Contraindicated. Sequential Compression Devices Ordered. Due to GIB    Mobility:   Basic Mobility Inpatient Raw Score: 6  JH-HLM Goal: 2: Bed activities/Dependent transfer  JH-HLM Achieved: 2: Bed activities/Dependent transfer  JH-HLM Goal achieved. Continue to encourage appropriate mobility.    Patient Centered Rounds: I performed bedside rounds with nursing staff today.   Discussions with Specialists or Other Care Team Provider: palliative, neurology    Education and Discussions with Family / Patient: Updated  (daughter in law) via phone.    Discussed with federico in detail. She knows pt has not received antiseizure medications for 48 hours as she/family has refused the medications thinking about weaning it off. On further discussion she clarified that she does not want to resume the seizure medications and she understands the risk of abrupt cessation which can be fatal. She confirmed twice to me that she does not want the seizure medications resumed.      Current Length of Stay: 3 day(s)  Current Patient Status: Inpatient   Certification Statement: The patient will continue to require additional inpatient hospital stay due to ongoing treatment for sacral decub and encephalopathy  Discharge Plan: Anticipate discharge in 48 hrs to [facility]    Code Status: Level 1 - Full Code    Subjective   Pt seen and examined at bedside during AM rounds  No acute overnight events reported  Pt is at mental baseline   Follows minimal commands     Objective :  Temp:  [98.3 °F (36.8 °C)-98.6 °F (37 °C)] 98.3 °F (36.8 °C)  HR:  [] 103  BP: (126-159)/(60-85) 159/85  Resp:  [16-18] 17  SpO2:   [93 %-96 %] 94 %  O2 Device: None (Room air)    Body mass index is 19.54 kg/m².     Input and Output Summary (last 24 hours):     Intake/Output Summary (Last 24 hours) at 3/7/2025 0808  Last data filed at 3/6/2025 2032  Gross per 24 hour   Intake 350 ml   Output 925 ml   Net -575 ml       Physical Exam  S1,2 (+) R  Lungs upper fields  clear  PEG tube in place  Moving RUE   Following commands like she closes eyes  Sacral decub under dressing  No agitation    Lines/Drains:  Lines/Drains/Airways       Active Status       Name Placement date Placement time Site Days    Gastrostomy/Enterostomy Percutaneous Endoscopic Gastrostomy (PEG) Umbilicus 03/05/25 0059  Umbilicus  2    Urethral Catheter 03/05/25 0059  --  2                  Urinary Catheter:  Goal for removal: N/A - Chronic Lawson                 Lab Results: I have reviewed the following results:   Results from last 7 days   Lab Units 03/07/25  0059 03/06/25  1725 03/06/25  0923   HEMOGLOBIN g/dL 9.2*   < > 6.7*   HEMATOCRIT %  --   --  20.9*    < > = values in this interval not displayed.     Results from last 7 days   Lab Units 03/06/25  1121 03/05/25  0233   SODIUM mmol/L  --  135   POTASSIUM mmol/L  --  4.6   CHLORIDE mmol/L  --  101   CO2 mmol/L  --  28   BUN mg/dL 32* 30*   CREATININE mg/dL  --  0.94   ANION GAP mmol/L  --  6   CALCIUM mg/dL  --  8.6   ALBUMIN g/dL  --  2.9*   TOTAL BILIRUBIN mg/dL  --  0.46   ALK PHOS U/L  --  63   ALT U/L  --  16   AST U/L  --  13   GLUCOSE RANDOM mg/dL  --  153*     Results from last 7 days   Lab Units 03/05/25  0610   INR  1.19     Results from last 7 days   Lab Units 03/07/25  0759 03/07/25  0631 03/07/25  0015 03/06/25  1813 03/06/25  1636 03/06/25  1155 03/06/25  1057 03/06/25  0751 03/05/25  2054 03/05/25  1828 03/05/25  1628 03/05/25  1054   POC GLUCOSE mg/dl 153* 128 95 123 120 138 122 156* 115 134 131 112               Recent Cultures (last 7 days):   Results from last 7 days   Lab Units 03/05/25  6481   URINE  CULTURE  >100,000 cfu/ml Pseudomonas aeruginosa*  >100,000 cfu/ml Enterococcus faecalis*       Imaging Results Review: I reviewed radiology reports from this admission including: CT head.  Other Study Results Review: EKG was reviewed.     Last 24 Hours Medication List:     Current Facility-Administered Medications:     albuterol (PROVENTIL HFA,VENTOLIN HFA) inhaler 2 puff, Q4H PRN    amLODIPine (NORVASC) tablet 10 mg, Daily    cefepime (MAXIPIME) 2 g/50 mL dextrose IVPB, Q12H, Last Rate: 2,000 mg (03/07/25 0517)    collagenase (SANTYL) ointment, Daily    insulin lispro (HumALOG/ADMELOG) 100 units/mL subcutaneous injection 1-5 Units, Q6H NURIA **AND** Fingerstick Glucose (POCT), Q6H    levETIRAcetam (KEPPRA) injection 250 mg, Daily With Breakfast **AND** levETIRAcetam (KEPPRA) injection 500 mg, HS    metroNIDAZOLE (FLAGYL) IVPB (premix) 500 mg 100 mL, Q8H, Last Rate: Stopped (03/07/25 0518)    multi-electrolyte (Plasmalyte-A/Isolyte-S PH 7.4/Normosol-R) IV solution, Continuous, Last Rate: 75 mL/hr (03/07/25 0515)    OXcarbazepine (TRILEPTAL) oral suspension 150 mg, BID    pantoprazole (PROTONIX) injection 40 mg, Q12H    vancomycin (VANCOCIN) IVPB (premix in dextrose) 1,000 mg 200 mL, Q24H, Last Rate: 1,000 mg (03/07/25 0517)    Administrative Statements   Today, Patient Was Seen By: Osman Girard MD  I have spent a total time of 55 minutes in caring for this patient on the day of the visit/encounter including Diagnostic results, Prognosis, Risks and benefits of tx options, Instructions for management, Patient and family education, Importance of tx compliance, Risk factor reductions, Impressions, Counseling / Coordination of care, Documenting in the medical record, Reviewing/placing orders in the medical record (including tests, medications, and/or procedures), Obtaining or reviewing history  , and Communicating with other healthcare professionals .    **Please Note: This note may have been constructed using a voice  recognition system.**

## 2025-03-07 NOTE — ASSESSMENT & PLAN NOTE
- GI following  - Patient's POA does not want any procedures with anesthesia  - Hemoglobin dropped as low as 6.7 s/p blood transfusion

## 2025-03-07 NOTE — PROGRESS NOTES
Hermila Montes is a 89 y.o. female who is currently ordered Vancomycin IV with management by the Pharmacy Consult service.  Relevant clinical data and objective / subjective history reviewed.  Vancomycin Assessment:  Indication and Goal AUC/Trough: Soft tissue (goal -600, trough >10); Urinary tract infection (goal -600, trough >10), -600, trough >10  Clinical Status: stable  Micro:     Renal Function:  SCr: 0.93 mg/dL  CrCl: 34.1 mL/min  Renal replacement: Not on dialysis  Days of Therapy: 3  Current Dose: 1000mg IV q24h  Vancomycin Plan:  New Dosinmg IV q24h  Estimated AUC: 493 mcg*hr/mL  Estimated Trough: 14.1 mcg/mL  Next Level: 3/9/25 with AM labs  Renal Function Monitoring: Daily BMP and UOP  Pharmacy will continue to follow closely for s/sx of nephrotoxicity, infusion reactions and appropriateness of therapy.  BMP and CBC will be ordered per protocol. We will continue to follow the patient’s culture results and clinical progress daily.    Elva Lanza, Pharmacist

## 2025-03-07 NOTE — ASSESSMENT & PLAN NOTE
- Likely multifactorial due to GI bleed/anemia, UTI, sacral ulcer wound, recent COVID infection, and poor cognitive reserve  - Avoid cefepime if able  - Conservative management of delirium: minimize noise, maintain day/night cycle, provide frequent redirection, avoid restraints if possible, etc.   0

## 2025-03-08 PROBLEM — E87.6 HYPOKALEMIA: Status: ACTIVE | Noted: 2025-03-08

## 2025-03-08 LAB
ANION GAP SERPL CALCULATED.3IONS-SCNC: 8 MMOL/L (ref 4–13)
BACTERIA UR CULT: ABNORMAL
BACTERIA UR CULT: ABNORMAL
BASOPHILS # BLD MANUAL: 0 THOUSAND/UL (ref 0–0.1)
BASOPHILS NFR MAR MANUAL: 0 % (ref 0–1)
BUN SERPL-MCNC: 25 MG/DL (ref 5–25)
CALCIUM SERPL-MCNC: 8.3 MG/DL (ref 8.4–10.2)
CHLORIDE SERPL-SCNC: 105 MMOL/L (ref 96–108)
CO2 SERPL-SCNC: 26 MMOL/L (ref 21–32)
CREAT SERPL-MCNC: 0.89 MG/DL (ref 0.6–1.3)
EOSINOPHIL # BLD MANUAL: 0.13 THOUSAND/UL (ref 0–0.4)
EOSINOPHIL NFR BLD MANUAL: 1 % (ref 0–6)
ERYTHROCYTE [DISTWIDTH] IN BLOOD BY AUTOMATED COUNT: 15.6 % (ref 11.6–15.1)
GFR SERPL CREATININE-BSD FRML MDRD: 56 ML/MIN/1.73SQ M
GLUCOSE SERPL-MCNC: 124 MG/DL (ref 65–140)
GLUCOSE SERPL-MCNC: 136 MG/DL (ref 65–140)
GLUCOSE SERPL-MCNC: 145 MG/DL (ref 65–140)
GLUCOSE SERPL-MCNC: 148 MG/DL (ref 65–140)
GLUCOSE SERPL-MCNC: 150 MG/DL (ref 65–140)
HCT VFR BLD AUTO: 26.1 % (ref 34.8–46.1)
HGB BLD-MCNC: 8.6 G/DL (ref 11.5–15.4)
HGB BLD-MCNC: 8.6 G/DL (ref 11.5–15.4)
HGB BLD-MCNC: 8.8 G/DL (ref 11.5–15.4)
HGB BLD-MCNC: 9 G/DL (ref 11.5–15.4)
LYMPHOCYTES # BLD AUTO: 0.38 THOUSAND/UL (ref 0.6–4.47)
LYMPHOCYTES # BLD AUTO: 3 % (ref 14–44)
MAGNESIUM SERPL-MCNC: 1.7 MG/DL (ref 1.9–2.7)
MCH RBC QN AUTO: 31 PG (ref 26.8–34.3)
MCHC RBC AUTO-ENTMCNC: 33 G/DL (ref 31.4–37.4)
MCV RBC AUTO: 94 FL (ref 82–98)
MONOCYTES # BLD AUTO: 0.25 THOUSAND/UL (ref 0–1.22)
MONOCYTES NFR BLD: 2 % (ref 4–12)
NEUTROPHILS # BLD MANUAL: 11.98 THOUSAND/UL (ref 1.85–7.62)
NEUTS SEG NFR BLD AUTO: 94 % (ref 43–75)
PLATELET # BLD AUTO: 298 THOUSANDS/UL (ref 149–390)
PLATELET BLD QL SMEAR: ADEQUATE
PMV BLD AUTO: 9.7 FL (ref 8.9–12.7)
POTASSIUM SERPL-SCNC: 2.6 MMOL/L (ref 3.5–5.3)
POTASSIUM SERPL-SCNC: 3.4 MMOL/L (ref 3.5–5.3)
RBC # BLD AUTO: 2.77 MILLION/UL (ref 3.81–5.12)
RBC MORPH BLD: NORMAL
SODIUM SERPL-SCNC: 139 MMOL/L (ref 135–147)
VANCOMYCIN SERPL-MCNC: 34 UG/ML (ref 10–20)
WBC # BLD AUTO: 12.74 THOUSAND/UL (ref 4.31–10.16)

## 2025-03-08 PROCEDURE — 80048 BASIC METABOLIC PNL TOTAL CA: CPT | Performed by: FAMILY MEDICINE

## 2025-03-08 PROCEDURE — 85027 COMPLETE CBC AUTOMATED: CPT | Performed by: FAMILY MEDICINE

## 2025-03-08 PROCEDURE — 83735 ASSAY OF MAGNESIUM: CPT | Performed by: FAMILY MEDICINE

## 2025-03-08 PROCEDURE — 85018 HEMOGLOBIN: CPT

## 2025-03-08 PROCEDURE — 80202 ASSAY OF VANCOMYCIN: CPT | Performed by: FAMILY MEDICINE

## 2025-03-08 PROCEDURE — 82948 REAGENT STRIP/BLOOD GLUCOSE: CPT

## 2025-03-08 PROCEDURE — 99233 SBSQ HOSP IP/OBS HIGH 50: CPT | Performed by: FAMILY MEDICINE

## 2025-03-08 PROCEDURE — 85007 BL SMEAR W/DIFF WBC COUNT: CPT | Performed by: FAMILY MEDICINE

## 2025-03-08 PROCEDURE — 84132 ASSAY OF SERUM POTASSIUM: CPT | Performed by: FAMILY MEDICINE

## 2025-03-08 RX ORDER — POTASSIUM CHLORIDE 20MEQ/15ML
40 LIQUID (ML) ORAL ONCE
Status: COMPLETED | OUTPATIENT
Start: 2025-03-08 | End: 2025-03-08

## 2025-03-08 RX ORDER — POTASSIUM CHLORIDE 14.9 MG/ML
20 INJECTION INTRAVENOUS ONCE
Status: COMPLETED | OUTPATIENT
Start: 2025-03-08 | End: 2025-03-08

## 2025-03-08 RX ORDER — MAGNESIUM SULFATE HEPTAHYDRATE 40 MG/ML
2 INJECTION, SOLUTION INTRAVENOUS ONCE
Status: COMPLETED | OUTPATIENT
Start: 2025-03-08 | End: 2025-03-08

## 2025-03-08 RX ORDER — ACETAMINOPHEN 10 MG/ML
1000 INJECTION, SOLUTION INTRAVENOUS ONCE
Status: COMPLETED | OUTPATIENT
Start: 2025-03-08 | End: 2025-03-09

## 2025-03-08 RX ADMIN — COLLAGENASE SANTYL: 250 OINTMENT TOPICAL at 09:50

## 2025-03-08 RX ADMIN — POTASSIUM CHLORIDE 40 MEQ: 20 SOLUTION ORAL at 12:41

## 2025-03-08 RX ADMIN — PIPERACILLIN AND TAZOBACTAM 4.5 G: 36; 4.5 INJECTION, POWDER, FOR SOLUTION INTRAVENOUS at 18:34

## 2025-03-08 RX ADMIN — MAGNESIUM SULFATE HEPTAHYDRATE 2 G: 40 INJECTION, SOLUTION INTRAVENOUS at 10:52

## 2025-03-08 RX ADMIN — CEFEPIME 2000 MG: 2 INJECTION, POWDER, FOR SOLUTION INTRAVENOUS at 00:59

## 2025-03-08 RX ADMIN — POTASSIUM CHLORIDE 40 MEQ: 20 SOLUTION ORAL at 12:43

## 2025-03-08 RX ADMIN — VANCOMYCIN HYDROCHLORIDE 750 MG: 750 INJECTION, SOLUTION INTRAVENOUS at 02:29

## 2025-03-08 RX ADMIN — PANTOPRAZOLE SODIUM 40 MG: 40 INJECTION, POWDER, FOR SOLUTION INTRAVENOUS at 01:03

## 2025-03-08 RX ADMIN — PIPERACILLIN SODIUM AND TAZOBACTAM SODIUM 4.5 G: 4; .5 INJECTION, POWDER, LYOPHILIZED, FOR SOLUTION INTRAVENOUS at 15:28

## 2025-03-08 RX ADMIN — METRONIDAZOLE 500 MG: 500 INJECTION, SOLUTION INTRAVENOUS at 01:42

## 2025-03-08 RX ADMIN — INSULIN LISPRO 1 UNITS: 100 INJECTION, SOLUTION INTRAVENOUS; SUBCUTANEOUS at 01:06

## 2025-03-08 RX ADMIN — ACETAMINOPHEN 1000 MG: 10 INJECTION INTRAVENOUS at 20:23

## 2025-03-08 RX ADMIN — SODIUM CHLORIDE, SODIUM GLUCONATE, SODIUM ACETATE, POTASSIUM CHLORIDE, MAGNESIUM CHLORIDE, SODIUM PHOSPHATE, DIBASIC, AND POTASSIUM PHOSPHATE 75 ML/HR: .53; .5; .37; .037; .03; .012; .00082 INJECTION, SOLUTION INTRAVENOUS at 17:06

## 2025-03-08 RX ADMIN — POTASSIUM CHLORIDE 20 MEQ: 14.9 INJECTION, SOLUTION INTRAVENOUS at 12:35

## 2025-03-08 RX ADMIN — METRONIDAZOLE 500 MG: 500 INJECTION, SOLUTION INTRAVENOUS at 09:50

## 2025-03-08 RX ADMIN — PANTOPRAZOLE SODIUM 40 MG: 40 INJECTION, POWDER, FOR SOLUTION INTRAVENOUS at 12:35

## 2025-03-08 NOTE — PLAN OF CARE
Problem: DISCHARGE PLANNING  Goal: Discharge to home or other facility with appropriate resources  Description: INTERVENTIONS:  - Identify barriers to discharge w/patient and caregiver  - Arrange for needed discharge resources and transportation as appropriate  - Identify discharge learning needs (meds, wound care, etc.)  - Arrange for interpretive services to assist at discharge as needed  - Refer to Case Management Department for coordinating discharge planning if the patient needs post-hospital services based on physician/advanced practitioner order or complex needs related to functional status, cognitive ability, or social support system  Outcome: Progressing     Problem: Prexisting or High Potential for Compromised Skin Integrity  Goal: Skin integrity is maintained or improved  Description: INTERVENTIONS:  - Identify patients at risk for skin breakdown  - Assess and monitor skin integrity  - Assess and monitor nutrition and hydration status  - Monitor labs   - Assess for incontinence   - Turn and reposition patient  - Assist with mobility/ambulation  - Relieve pressure over bony prominences  - Avoid friction and shearing  - Provide appropriate hygiene as needed including keeping skin clean and dry  - Evaluate need for skin moisturizer/barrier cream  - Collaborate with interdisciplinary team   - Patient/family teaching  - Consider wound care consult   Outcome: Progressing     Problem: Nutrition/Hydration-ADULT  Goal: Nutrient/Hydration intake appropriate for improving, restoring or maintaining nutritional needs  Description: Monitor and assess patient's nutrition/hydration status for malnutrition. Collaborate with interdisciplinary team and initiate plan and interventions as ordered.  Monitor patient's weight and dietary intake as ordered or per policy. Utilize nutrition screening tool and intervene as necessary. Determine patient's food preferences and provide high-protein, high-caloric foods as appropriate.      INTERVENTIONS:  - Monitor oral intake, urinary output, labs, and treatment plans  - Assess nutrition and hydration status and recommend course of action  - Evaluate amount of meals eaten  - Assist patient with eating if necessary   - Allow adequate time for meals  - Recommend/ encourage appropriate diets, oral nutritional supplements, and vitamin/mineral supplements  - Order, calculate, and assess calorie counts as needed  - Recommend, monitor, and adjust tube feedings and TPN/PPN based on assessed needs  - Assess need for intravenous fluids  - Provide specific nutrition/hydration education as appropriate  - Include patient/family/caregiver in decisions related to nutrition  Outcome: Progressing

## 2025-03-08 NOTE — PROGRESS NOTES
Hermila Montes is a 89 y.o. female who is currently ordered Vancomycin IV with management by the Pharmacy Consult service.  Relevant clinical data and objective / subjective history reviewed.  Vancomycin Assessment:  Indication and Goal AUC/Trough: Soft tissue (goal -600, trough >10); Urinary tract infection (goal -600, trough >10), -600, trough >10  Clinical Status: stable  Micro:     Renal Function:  SCr: 0.89 mg/dL  CrCl: 31 mL/min  Renal replacement: Not on dialysis  Days of Therapy: 4  Current Dose: 750mg IV q24h  Vancomycin Plan:  New Dosing: continue 750mg IV q24h  Estimated AUC: 544 mcg*hr/mL  Estimated Trough: 15.8 mcg/mL  Next Level: 3/15 AM draw  Renal Function Monitoring: Daily BMP and UOP  Pharmacy will continue to follow closely for s/sx of nephrotoxicity, infusion reactions and appropriateness of therapy.  BMP and CBC will be ordered per protocol. We will continue to follow the patient’s culture results and clinical progress daily.    Maria C Florence, Pharmacist

## 2025-03-08 NOTE — ASSESSMENT & PLAN NOTE
Per daughter-in-law patient has developed sacral ulcer after being diagnosed with CVA.  Patient has had debridement done recently and multiple treatment changes for the care of the wound.  CT abdomen pelvis-3/4/2025-No active GI bleed is identified. Sacral decubitus ulcer on the left with associated abscess in the subcutaneous soft tissues measuring 6 x 6.8 x 1.8 cm. Patulous partially fluid-filled esophagus with wall thickening. Correlate for esophagitis. GI does not plan EGD as per familys wishes. Small bilateral pleural effusions with basilar atelectasis.   Switch IV cefepime/vanco/flagyl to zosyn.  Plan to continue zosyn while here and transition to augmentin (antibiotics until 3/13/25)   Wound care consulted.  IR consult appreciated: no indication for drain as per recs  General surgery consulted and debridement done at beside. Appreciate recommendations  Antibiotic: cefepime and vancomycin

## 2025-03-08 NOTE — PROGRESS NOTES
Progress Note - Hospitalist   Name: Hermila Montes 89 y.o. female I MRN: [id number]  Unit/Bed#: -01 I Date of Admission: 3/4/2025   Date of Service: 3/8/2025 I Hospital Day: 4    Assessment & Plan  Upper GI bleed  Pts POA does not want any procedures with anesthesia and as per the discussion with GI they dont want EGD  Hb dropping causing ABLA, 1 U ordered , verbal consent from DIL/POA obtainted. Hb s/p prbc was 9.2, but now downtrended to 8.6  Cont IV protonix  Cont to check H&H q12H  Encephalopathy acute  Gradually getting closer to baseline  Metabolic from sacral infection vs toxic from antiseizure meds vs worsening baseline cognition s/p CVA and recent COVID vs delirum  Plan to wean off gradually of anti seizure meds   Poor prognosis  Palliative consulted  Oropharyngeal dysphagia  Patient has history of dysphagia. Per daughter-in-law [facility] and was cleared for diet of purée with nectar thick liquids.  Daughter-in-law states during the days they were feeding patient food and holding tube feed and at night they were giving tube feed for supplementation.  Speech eval done: unable to tolerate PO  Will start jevity 1.2 at a low rate and increase to goal as tolerated. 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  Daughter-in-law says patient has been doing physical therapy in which she has improved with movement. Patient is currently bedbound, with flaccid left arm. Patient also has expressive aphasia, daughter-in-law at 1 point patient was nonverbal but is now able to say a few words here and there.  Was started on AED keppra and trileptal at [facility] and since then it is being gradually weaned.  Pts DIL/family have refused the medication administration and  has not received   Turn and reposition.  Specialty bed ordered.  PT and OT evaluation and treat.  Sacral ulcer (HCC)  Per daughter-in-law patient has developed sacral ulcer after being diagnosed with CVA.  Patient has had debridement done recently and multiple treatment changes for the care of the wound.  CT abdomen pelvis-3/4/2025-No active GI bleed is identified. Sacral decubitus ulcer on the left with associated abscess in the subcutaneous soft tissues measuring 6 x 6.8 x 1.8 cm. Patulous partially fluid-filled esophagus with wall thickening. Correlate for esophagitis. GI does not plan EGD as per familys wishes. Small bilateral pleural effusions with basilar atelectasis.   Switch IV cefepime/vanco/flagyl to zosyn.  Plan to continue zosyn while here and transition to augmentin (antibiotics until 3/13/25)   Wound care consulted.  IR consult appreciated: no indication for drain as per recs  General surgery consulted and debridement done at beside. Appreciate recommendations  Antibiotic: cefepime and vancomycin  Severe protein-calorie malnutrition (HCC)  Malnutrition Findings:   Adult Malnutrition type: Chronic illness  Adult Degree of Malnutrition: Other severe protein calorie malnutrition  Malnutrition Characteristics: Muscle loss, Weight loss              360 Statement: Malnutrition related to chronic medical conditions as evidenced by 6% weight loss in 1 month and severe muscle wasting (clavicles, temples). Treated with Enteral nutrition via PEG tube (pending EN initiation)    BMI Findings:        Body mass index is 19.54 kg/m².     Starting tube feeds  Severe protein-calorie malnutrition POA duet to increased nutritional needs in the setting of acute illness  and wound healing a/e/b 6% weight loss in 1 month and severe muscle wasting (clavicles, temples) treated with Enteral nutrition and ongoing registered nutritionist evaluation for optimal protein/calorie intake.      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   Palliative care patient  [facility] had with family, continue full code  Complicated UTI (urinary tract infection)  Lawson present  E fecalis and pseudomonas (+) in urine   Continue cefepime and vancomycin  History of epilepsia partialis continua (HCC)  Was started on 2 AED at [facility]  Plan was to wean off per [facility] but family does not want it given and they understand that it could fatal but they feel AED is making her not resposive, discussed with neurology here but as family does not want AED, neurology does not have any further recommendations  Hypokalemia  2.6  Give 40meq via PEG and 20meq via IV  Start tele until >3  EKG ordered  Mild hypomagnesemia: 2g IV ordered    VTE Pharmacologic Prophylaxis: VTE Score: 5 High Risk (Score >/= 5) - Pharmacological DVT Prophylaxis Contraindicated. Sequential Compression Devices Ordered. Due to GIB    Mobility:   Basic Mobility Inpatient Raw Score: 6  JH-HLM Goal: 2: Bed activities/Dependent transfer  JH-HLM Achieved: 1: Laying in bed  JH-HLM Goal achieved. Continue to encourage appropriate mobility.    Patient Centered Rounds: I performed bedside rounds with nursing staff today.   Discussions with Specialists or Other Care Team Provider: palliative, neurology    Education and Discussions with Family / Patient: Updated  (daughter in law) via phone.    Discussed with federico in detail. She knows pt has not received antiseizure medications for 48 hours as she/family has refused the medications thinking about  weaning it off. On further discussion she clarified that she does not want to resume the seizure medications and she understands the risk of abrupt cessation which can be fatal. She confirmed twice to me that she does not want the seizure medications resumed.      Current Length of Stay: 4 day(s)  Current Patient Status: Inpatient   Certification Statement: The patient will continue to require additional inpatient hospital stay due to ongoing treatment for sacral decub and encephalopathy  Discharge Plan: Anticipate discharge in 48 hrs to [facility]    Code Status: Level 1 - Full Code    Subjective   Pt seen and examined at bedside during AM rounds  No acute overnight events reported  Pt is at mental baseline   Follows minimal commands   Poor prognosis    Objective :  Temp:  [98.5 °F (36.9 °C)-98.9 °F (37.2 °C)] 98.9 °F (37.2 °C)  HR:  [86-97] 91  BP: (148-166)/() 161/79  SpO2:  [92 %-96 %] 92 %    Body mass index is 19.54 kg/m².     Input and Output Summary (last 24 hours):     Intake/Output Summary (Last 24 hours) at 3/8/2025 0926  Last data filed at 3/8/2025 0638  Gross per 24 hour   Intake --   Output 1150 ml   Net -1150 ml       Physical Exam  S1,2 (+) R  Lungs upper fields  clear  PEG tube in place  Moving RUE   Following commands like she closes eyes and moves right arm  Sacral decub under dressing  No agitation    Lines/Drains:  Lines/Drains/Airways       Active Status       Name Placement date Placement time Site Days    Gastrostomy/Enterostomy Percutaneous Endoscopic Gastrostomy (PEG) Umbilicus 03/05/25 0059  Umbilicus  3    Urethral Catheter 03/05/25 0059  --  3                  Urinary Catheter:  Goal for removal: N/A - Chronic Lawson                 Lab Results: I have reviewed the following results:   Results from last 7 days   Lab Units 03/08/25  0802 03/08/25  0627 03/07/25  1910 03/07/25  0912   WBC Thousand/uL  --  12.74*  --  15.66*   HEMOGLOBIN g/dL 8.6* 8.6*   < > 10.0*   HEMATOCRIT %  --   26.1*  --  30.3*   PLATELETS Thousands/uL  --  298  --  316   SEGS PCT %  --   --   --  85*   LYMPHO PCT %  --  3*  --  3*   MONO PCT %  --  2*  --  6   EOS PCT %  --  1  --  3    < > = values in this interval not displayed.     Results from last 7 days   Lab Units 03/08/25  0627 03/06/25  1121 03/05/25  0233   SODIUM mmol/L 139   < > 135   POTASSIUM mmol/L 2.6*   < > 4.6   CHLORIDE mmol/L 105   < > 101   CO2 mmol/L 26   < > 28   BUN mg/dL 25   < > 30*   CREATININE mg/dL 0.89   < > 0.94   ANION GAP mmol/L 8   < > 6   CALCIUM mg/dL 8.3*   < > 8.6   ALBUMIN g/dL  --   --  2.9*   TOTAL BILIRUBIN mg/dL  --   --  0.46   ALK PHOS U/L  --   --  63   ALT U/L  --   --  16   AST U/L  --   --  13   GLUCOSE RANDOM mg/dL 148*   < > 153*    < > = values in this interval not displayed.     Results from last 7 days   Lab Units 03/05/25  0610   INR  1.19     Results from last 7 days   Lab Units 03/08/25  0626 03/08/25  0039 03/07/25  1845 03/07/25  1113 03/07/25  0759 03/07/25  0631 03/07/25  0015 03/06/25  1813 03/06/25  1636 03/06/25  1155 03/06/25  1057 03/06/25  0751   POC GLUCOSE mg/dl 145* 150* 128 138 153* 128 95 123 120 138 122 156*               Recent Cultures (last 7 days):   Results from last 7 days   Lab Units 03/05/25  0458   URINE CULTURE  >100,000 cfu/ml Pseudomonas aeruginosa*  >100,000 cfu/ml Enterococcus faecalis*       Imaging Results Review: I reviewed radiology reports from this admission including: CT head.  Other Study Results Review: EKG was reviewed.     Last 24 Hours Medication List:     Current Facility-Administered Medications:     albuterol (PROVENTIL HFA,VENTOLIN HFA) inhaler 2 puff, Q4H PRN    amLODIPine (NORVASC) tablet 10 mg, Daily    cefepime (MAXIPIME) 2 g/50 mL dextrose IVPB, Q12H, Last Rate: 2,000 mg (03/08/25 0059)    collagenase (SANTYL) ointment, Daily    insulin lispro (HumALOG/ADMELOG) 100 units/mL subcutaneous injection 1-5 Units, Q6H NURIA **AND** Fingerstick Glucose (POCT), Q6H     levETIRAcetam (KEPPRA) injection 250 mg, Daily With Breakfast **AND** levETIRAcetam (KEPPRA) injection 500 mg, HS    magnesium sulfate 2 g/50 mL IVPB (premix) 2 g, Once    metroNIDAZOLE (FLAGYL) IVPB (premix) 500 mg 100 mL, Q8H, Last Rate: 500 mg (03/08/25 0142)    multi-electrolyte (Plasmalyte-A/Isolyte-S PH 7.4/Normosol-R) IV solution, Continuous, Last Rate: 75 mL/hr (03/07/25 0515)    OXcarbazepine (TRILEPTAL) oral suspension 150 mg, BID    pantoprazole (PROTONIX) injection 40 mg, Q12H    potassium chloride 20 mEq IVPB (premix), Once    potassium chloride oral solution 40 mEq, Once    potassium chloride oral solution 40 mEq, Once    vancomycin (VANCOCIN) IVPB (premix in dextrose) 750 mg 150 mL, Q24H, Last Rate: 750 mg (03/08/25 0229)    Administrative Statements   Today, Patient Was Seen By: Osman Girard MD  I have spent a total time of 45 minutes in caring for this patient on the day of the visit/encounter including Diagnostic results, Prognosis, Risks and benefits of tx options, Instructions for management, Patient and family education, Importance of tx compliance, Risk factor reductions, Impressions, Counseling / Coordination of care, Documenting in the medical record, Reviewing/placing orders in the medical record (including tests, medications, and/or procedures), Obtaining or reviewing history  , and Communicating with other healthcare professionals .    **Please Note: This note may have been constructed using a voice recognition system.**

## 2025-03-08 NOTE — ASSESSMENT & PLAN NOTE
2.6  Give 40meq via PEG and 20meq via IV  Start tele until >3  EKG ordered  Mild hypomagnesemia: 2g IV ordered

## 2025-03-08 NOTE — ASSESSMENT & PLAN NOTE
Was started on 2 AED at [facility]  Plan was to wean off per [facility] but family does not want it given and they understand that it could fatal but they feel AED is making her not resposive, discussed with neurology here but as family does not want AED, neurology does not have any further recommendations

## 2025-03-08 NOTE — ASSESSMENT & PLAN NOTE
Pts POA does not want any procedures with anesthesia and as per the discussion with GI they dont want EGD  Hb dropping causing ABLA, 1 U ordered , verbal consent from DIL/POA obtainted. Hb s/p prbc was 9.2, but now downtrended to 8.6  Cont IV protonix  Cont to check H&H q12H

## 2025-03-08 NOTE — ASSESSMENT & PLAN NOTE
Patient has history of dysphagia. Per daughter-in-law patient was seen by speech and [facility] and was cleared for diet of purée with nectar thick liquids.  Daughter-in-law states during the days they were feeding patient food and holding tube feed and at night they were giving tube feed for supplementation.  Speech eval done: unable to tolerate PO  Will start jevity 1.2 at a low rate and increase to goal as tolerated. Family did not want this to started unless PEG placement was confirmed so I ordered CT abd yesterday which confirmed the PEG placement as WNL and nursing given orders to start TG slowly

## 2025-03-08 NOTE — NURSING NOTE
"Per patient's daughter Radha; documented in MAR; chart summary note (to physician/s) post discussion.     Radha wants Hermila 'weaned from anti-seizure medications. As previously done at [facility].'    Radha provided contact information for [facility]; and director of nursing. To be contacted before 3PM; not likely to answer phone; difficult to 'get a hold of'.     Per Radha, 'Hermila goes backwards on anti-seizure medications.DO NOT give / PLEASE HOLD these (anti-seizure) medications until doctor can confirm previous dosing / medication administration with Doctor at [facility].'    Radha further states, \"Hermila was placed on these medications since her stroke. was weaned down to '100 some' mg of Keppra. While at [facility] and was more oriented and awake.\"     Oral medications held r/t patient's orientation; disoriented x4; grunting; non-verbal. Chronic PEG tube in place. Patient unable to swallow; coughing w/secretions. Suctioned PRN.     Per Radha do not access / use PEG until placement confirmed, states, \"Hermila was pulling at the tube which caused her GI bleed. Does not want to 'risk' using tube.\"    Provided Radha w/unit fax # ([phone]) to ease communication; sending documentation. Informed Radha to re-establish care w/SLIM.    Radha is present at bedside after 4 PM; cannot 'come earlier r/t personal schedule'.     Oncoming; day nurse informed. As limited resources available overnight. Nightshift RN present; communicating with Radha when present.     Educated Radha regarding interventions. Expressed understanding; willingness to be involved w/patient's care.     Sticky Notes to Physicians  Per pt's daughter--Hermila is to be weaned from Keppra; Trileptal. SLIM please contact nursing home @ [facility] (call before 3PM) - ([phone]), director of nursing- ([phone])   Last edited by Sally Lei RN on 03/05/25 at 1949     Current note  Sally " ANGELICA Lei 03/05/25 1948 - Per pt's daughter--Hermila is to be weaned from Keppra; Trileptal. SLIM please contact nursing [place] @ [facility] (call before 3PM) - ([phone]), director of nursing- ([phone])

## 2025-03-08 NOTE — ASSESSMENT & PLAN NOTE
1 U PRBC ordered, hb 8.6 now  H&H Q12H  Verbal consent from DIL obtained      Sacral pressure ulcer, stage 4 POA due to decreased mobility in the setting of recent stroke a/e/b left sacral wound measuring 6x6.8x1.8cm with abscess treated with wound care consultation, IR consultation, Q2 hour turn and reposition, offload pressure, evaluate for pressure reduction mattress, registered dietitian evaluation to optimize protein/calorie intake for wound healing.

## 2025-03-09 LAB
ANION GAP SERPL CALCULATED.3IONS-SCNC: 7 MMOL/L (ref 4–13)
ANISOCYTOSIS BLD QL SMEAR: PRESENT
BASOPHILS # BLD MANUAL: 0.32 THOUSAND/UL (ref 0–0.1)
BASOPHILS NFR MAR MANUAL: 3 % (ref 0–1)
BUN SERPL-MCNC: 20 MG/DL (ref 5–25)
CALCIUM SERPL-MCNC: 8.2 MG/DL (ref 8.4–10.2)
CHLORIDE SERPL-SCNC: 106 MMOL/L (ref 96–108)
CO2 SERPL-SCNC: 27 MMOL/L (ref 21–32)
CREAT SERPL-MCNC: 0.87 MG/DL (ref 0.6–1.3)
EOSINOPHIL # BLD MANUAL: 0.65 THOUSAND/UL (ref 0–0.4)
EOSINOPHIL NFR BLD MANUAL: 6 % (ref 0–6)
ERYTHROCYTE [DISTWIDTH] IN BLOOD BY AUTOMATED COUNT: 15.8 % (ref 11.6–15.1)
GFR SERPL CREATININE-BSD FRML MDRD: 58 ML/MIN/1.73SQ M
GLUCOSE SERPL-MCNC: 138 MG/DL (ref 65–140)
GLUCOSE SERPL-MCNC: 146 MG/DL (ref 65–140)
GLUCOSE SERPL-MCNC: 151 MG/DL (ref 65–140)
GLUCOSE SERPL-MCNC: 165 MG/DL (ref 65–140)
GLUCOSE SERPL-MCNC: 165 MG/DL (ref 65–140)
GLUCOSE SERPL-MCNC: 169 MG/DL (ref 65–140)
HCT VFR BLD AUTO: 29 % (ref 34.8–46.1)
HGB BLD-MCNC: 9 G/DL (ref 11.5–15.4)
HGB BLD-MCNC: 9.2 G/DL (ref 11.5–15.4)
HGB BLD-MCNC: 9.4 G/DL (ref 11.5–15.4)
LYMPHOCYTES # BLD AUTO: 1.19 THOUSAND/UL (ref 0.6–4.47)
LYMPHOCYTES # BLD AUTO: 9 % (ref 14–44)
MAGNESIUM SERPL-MCNC: 2.2 MG/DL (ref 1.9–2.7)
MCH RBC QN AUTO: 30.4 PG (ref 26.8–34.3)
MCHC RBC AUTO-ENTMCNC: 31.7 G/DL (ref 31.4–37.4)
MCV RBC AUTO: 96 FL (ref 82–98)
METAMYELOCYTE ABSOLUTE CT: 0.11 THOUSAND/UL (ref 0–0.1)
METAMYELOCYTES NFR BLD MANUAL: 1 % (ref 0–1)
MONOCYTES # BLD AUTO: 0.43 THOUSAND/UL (ref 0–1.22)
MONOCYTES NFR BLD: 4 % (ref 4–12)
MYELOCYTE ABSOLUTE CT: 0.11 THOUSAND/UL (ref 0–0.1)
MYELOCYTES NFR BLD MANUAL: 1 % (ref 0–1)
NEUTROPHILS # BLD MANUAL: 8 THOUSAND/UL (ref 1.85–7.62)
NEUTS SEG NFR BLD AUTO: 74 % (ref 43–75)
OVALOCYTES BLD QL SMEAR: PRESENT
PLATELET # BLD AUTO: 314 THOUSANDS/UL (ref 149–390)
PLATELET BLD QL SMEAR: ADEQUATE
PMV BLD AUTO: 10 FL (ref 8.9–12.7)
POIKILOCYTOSIS BLD QL SMEAR: PRESENT
POTASSIUM SERPL-SCNC: 3.2 MMOL/L (ref 3.5–5.3)
RBC # BLD AUTO: 3.03 MILLION/UL (ref 3.81–5.12)
RBC MORPH BLD: PRESENT
SODIUM SERPL-SCNC: 140 MMOL/L (ref 135–147)
VARIANT LYMPHS # BLD AUTO: 2 %
WBC # BLD AUTO: 10.81 THOUSAND/UL (ref 4.31–10.16)

## 2025-03-09 PROCEDURE — 85018 HEMOGLOBIN: CPT

## 2025-03-09 PROCEDURE — 80048 BASIC METABOLIC PNL TOTAL CA: CPT | Performed by: FAMILY MEDICINE

## 2025-03-09 PROCEDURE — 85007 BL SMEAR W/DIFF WBC COUNT: CPT | Performed by: FAMILY MEDICINE

## 2025-03-09 PROCEDURE — 82948 REAGENT STRIP/BLOOD GLUCOSE: CPT

## 2025-03-09 PROCEDURE — 85027 COMPLETE CBC AUTOMATED: CPT | Performed by: FAMILY MEDICINE

## 2025-03-09 PROCEDURE — 99233 SBSQ HOSP IP/OBS HIGH 50: CPT | Performed by: FAMILY MEDICINE

## 2025-03-09 PROCEDURE — 83735 ASSAY OF MAGNESIUM: CPT | Performed by: FAMILY MEDICINE

## 2025-03-09 RX ORDER — POTASSIUM CHLORIDE 14.9 MG/ML
20 INJECTION INTRAVENOUS ONCE
Status: COMPLETED | OUTPATIENT
Start: 2025-03-09 | End: 2025-03-09

## 2025-03-09 RX ORDER — POTASSIUM CHLORIDE 20MEQ/15ML
20 LIQUID (ML) ORAL ONCE
Status: COMPLETED | OUTPATIENT
Start: 2025-03-09 | End: 2025-03-09

## 2025-03-09 RX ADMIN — INSULIN LISPRO 1 UNITS: 100 INJECTION, SOLUTION INTRAVENOUS; SUBCUTANEOUS at 11:40

## 2025-03-09 RX ADMIN — PANTOPRAZOLE SODIUM 40 MG: 40 INJECTION, POWDER, FOR SOLUTION INTRAVENOUS at 00:02

## 2025-03-09 RX ADMIN — COLLAGENASE SANTYL 1 APPLICATION: 250 OINTMENT TOPICAL at 08:07

## 2025-03-09 RX ADMIN — PIPERACILLIN AND TAZOBACTAM 4.5 G: 36; 4.5 INJECTION, POWDER, FOR SOLUTION INTRAVENOUS at 11:40

## 2025-03-09 RX ADMIN — PIPERACILLIN AND TAZOBACTAM 4.5 G: 36; 4.5 INJECTION, POWDER, FOR SOLUTION INTRAVENOUS at 19:33

## 2025-03-09 RX ADMIN — SODIUM CHLORIDE, SODIUM GLUCONATE, SODIUM ACETATE, POTASSIUM CHLORIDE, MAGNESIUM CHLORIDE, SODIUM PHOSPHATE, DIBASIC, AND POTASSIUM PHOSPHATE 75 ML/HR: .53; .5; .37; .037; .03; .012; .00082 INJECTION, SOLUTION INTRAVENOUS at 12:37

## 2025-03-09 RX ADMIN — INSULIN LISPRO 1 UNITS: 100 INJECTION, SOLUTION INTRAVENOUS; SUBCUTANEOUS at 18:46

## 2025-03-09 RX ADMIN — POTASSIUM CHLORIDE 20 MEQ: 20 SOLUTION ORAL at 07:56

## 2025-03-09 RX ADMIN — AMLODIPINE BESYLATE 10 MG: 10 TABLET ORAL at 08:00

## 2025-03-09 RX ADMIN — INSULIN LISPRO 1 UNITS: 100 INJECTION, SOLUTION INTRAVENOUS; SUBCUTANEOUS at 05:34

## 2025-03-09 RX ADMIN — PIPERACILLIN AND TAZOBACTAM 4.5 G: 36; 4.5 INJECTION, POWDER, FOR SOLUTION INTRAVENOUS at 04:09

## 2025-03-09 RX ADMIN — PANTOPRAZOLE SODIUM 40 MG: 40 INJECTION, POWDER, FOR SOLUTION INTRAVENOUS at 14:18

## 2025-03-09 RX ADMIN — POTASSIUM CHLORIDE 20 MEQ: 14.9 INJECTION, SOLUTION INTRAVENOUS at 09:11

## 2025-03-09 NOTE — NURSING NOTE
Pt daughter in law requesting information to be sent to [facility] - inpatient rehabilitation facility.   Contact # [phone]

## 2025-03-09 NOTE — ASSESSMENT & PLAN NOTE
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  Daughter-in-law says patient has been doing physical therapy in which she has improved with movement. Patient is currently bedbound, with flaccid left arm. Patient also has expressive aphasia, daughter-in-law at 1 point patient was nonverbal but is now able to say a few words here and there.  Was started on AED keppra and trileptal at [facility] and since then it is being gradually weaned.  Pts DIL/family have refused the medication administration and has not received   Turn and reposition.  Specialty bed ordered.  PT and OT evaluation and treat.

## 2025-03-09 NOTE — CONSULTS
Vancomycin IV Pharmacy-to-Dose Consultation    Hermila Montes is a 89 y.o. female who was receiving Vancomycin IV with management by the Pharmacy Consult service for treatment of Soft tissue (goal -600, trough >10), Urinary tract infection (goal -600, trough >10).    The patient's Vancomycin therapy has been completed / discontinued. Thank you for allowing us to take part in this patient's care. Pharmacy will sign-off now; please call or re-consult if there are any questions.      Maria C Florence, Pharmacist

## 2025-03-09 NOTE — ASSESSMENT & PLAN NOTE
Malnutrition Findings:   Adult Malnutrition type: Chronic illness  Adult Degree of Malnutrition: Other severe protein calorie malnutrition  Malnutrition Characteristics: Muscle loss, Weight loss              360 Statement: Malnutrition related to chronic medical conditions as evidenced by 6% weight loss in 1 month and severe muscle wasting (clavicles, temples). Treated with Enteral nutrition via PEG tube (pending EN initiation)    BMI Findings:        Body mass index is 19.54 kg/m².     Starting tube feeds, now at goal of 50cc/hr, tolerating well  Severe protein-calorie malnutrition POA duet to increased nutritional needs in the setting of acute illness  and wound healing a/e/b 6% weight loss in 1 month and severe muscle wasting (clavicles, temples) treated with Enteral nutrition and ongoing registered nutritionist evaluation for optimal protein/calorie intake.

## 2025-03-09 NOTE — ASSESSMENT & PLAN NOTE
Gradually getting closer to baseline  Metabolic from sacral infection vs toxic from antiseizure meds vs worsening baseline cognition s/p CVA and recent COVID vs delirum  Plan to wean off gradually of anti seizure meds   Poor prognosis  Palliative consulted: signed off

## 2025-03-09 NOTE — ASSESSMENT & PLAN NOTE
Lawson present  E fecalis VRE and pseudomonas (+) in urine   Considering pts improvement with these antibiotics, this likely represents colonization   Initially on cefepime and vancomycin, now on zosyn as of 3/8

## 2025-03-09 NOTE — ASSESSMENT & PLAN NOTE
Pts POA does not want any procedures with anesthesia and as per the discussion with GI they dont want EGD  Hb dropping causing ABLA, 1 U ordered , verbal consent from DIL/POA obtainted. Hb s/p prbc was 9.2, stable at 9.2  Cont IV protonix  Daily H&H checks now on considering stability of Hb

## 2025-03-09 NOTE — PROGRESS NOTES
Progress Note - Hospitalist   Name: Hermila Montes 89 y.o. female I MRN: [id number]  Unit/Bed#: 2 E 261-01 I Date of Admission: 3/4/2025   Date of Service: 3/9/2025 I Hospital Day: 5    Assessment & Plan  Upper GI bleed  Pts POA does not want any procedures with anesthesia and as per the discussion with GI they dont want EGD  Hb dropping causing ABLA, 1 U ordered , verbal consent from DIL/POA obtainted. Hb s/p prbc was 9.2, stable at 9.2  Cont IV protonix  Daily H&H checks now on considering stability of Hb   Encephalopathy acute  Gradually getting closer to baseline  Metabolic from sacral infection vs toxic from antiseizure meds vs worsening baseline cognition s/p CVA and recent COVID vs delirum  Plan to wean off gradually of anti seizure meds   Poor prognosis  Palliative consulted: signed off  Oropharyngeal dysphagia  Patient has history of dysphagia. Per daughter-in-law [facility] and was [facility] for diet of purée with nectar thick liquids.  Daughter-in-law states during the days they were feeding patient food and holding tube feed and at night they were giving tube feed for supplementation.  Speech eval done: unable to tolerate PO  Will start jevity 1.2 at a low rate and increase to goal as tolerated. 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  Daughter-in-law says patient has been doing physical therapy in which she has improved with movement. Patient is currently bedbound, with flaccid left arm. Patient also has expressive aphasia, daughter-in-law at 1 point patient was nonverbal but is now able to say a few words here and there.  Was started on AED keppra and trileptal at [facility] and since then it is being gradually weaned.  Pts DIL/family have refused the  medication administration and has not received   Turn and reposition.  Specialty bed ordered.  PT and OT evaluation and treat.  Sacral ulcer (HCC)  Per daughter-in-law patient has developed sacral ulcer after being diagnosed with CVA.  Patient has had debridement done recently and multiple treatment changes for the care of the wound.  CT abdomen pelvis-3/4/2025-No active GI bleed is identified. Sacral decubitus ulcer on the left with associated abscess in the subcutaneous soft tissues measuring 6 x 6.8 x 1.8 cm. Patulous partially fluid-filled esophagus with wall thickening. Correlate for esophagitis. GI does not plan EGD as per familys wishes. Small bilateral pleural effusions with basilar atelectasis.   Switch IV cefepime/vanco/flagyl to zosyn.  Plan to continue zosyn while here and transition to augmentin (antibiotics until 3/13/25)   Wound care consulted.  IR consult appreciated: no indication for drain as per recs  General surgery consulted and debridement done at beside. Appreciate recommendations  Antibiotic: cefepime and vancomycin  Severe protein-calorie malnutrition (HCC)  Malnutrition Findings:   Adult Malnutrition type: Chronic illness  Adult Degree of Malnutrition: Other severe protein calorie malnutrition  Malnutrition Characteristics: Muscle loss, Weight loss              360 Statement: Malnutrition related to chronic medical conditions as evidenced by 6% weight loss in 1 month and severe muscle wasting (clavicles, temples). Treated with Enteral nutrition via PEG tube (pending EN initiation)    BMI Findings:        Body mass index is 19.54 kg/m².     Starting tube feeds, now at goal of 50cc/hr, tolerating well  Severe protein-calorie malnutrition POA duet to increased nutritional needs in the setting of acute illness  and wound healing a/e/b 6% weight loss in 1 month and severe muscle wasting (clavicles, temples) treated with Enteral nutrition and ongoing registered nutritionist evaluation for optimal  protein/calorie intake.      ACUTE POSTHEMORRHAGIC ANEMIA  1 U PRBC ordered, hb 9.2 now  H&H Q12H  Verbal consent from DIL obtained      Sacral pressure ulcer, stage 4 POA due to decreased mobility in the setting of recent stroke a/e/b left sacral wound measuring 6x6.8x1.8cm with abscess treated with wound care consultation, IR consultation, Q2 hour turn and reposition, offload pressure, evaluate for pressure reduction mattress, registered dietitian evaluation to optimize protein/calorie intake for wound healing.   Palliative care patient  [facility] had with family, continue full code  Complicated UTI (urinary tract infection)  Lawson present  E fecalis VRE and pseudomonas (+) in urine   Considering pts improvement with these antibiotics, this likely represents colonization   Initially on cefepime and vancomycin, now on zosyn as of 3/8  History of epilepsia partialis continua (HCC)  Was started on 2 AED at [facility]  Plan was to wean off per [facility] but family does not want it given and they understand that it could be fatal but they feel AED is making her not responsive and altered, discussed with neurology here but as family does not want AED, neurology does not have any further recommendations  Hypokalemia  This am 3.2: 20meq PO and 20meq IV  DC tele once persistent normal    VTE Pharmacologic Prophylaxis: VTE Score: 5  SCDs considering UGIB    Mobility:   Basic Mobility Inpatient Raw Score: 6  JH-HLM Goal: 2: Bed activities/Dependent transfer  JH-HLM Achieved: 2: Bed activities/Dependent transfer  JH-HLM Goal achieved. Continue to encourage appropriate mobility.    Patient Centered Rounds: I performed bedside rounds with nursing staff today.   Discussions with Specialists or Other Care Team Provider: N/A    Education and Discussions with Family / Patient: Updated  (daughter in law) via phone.    Current Length of Stay: 5 day(s)  Current Patient Status: Inpatient   Certification Statement: The  patient will continue to require additional inpatient hospital stay due to monitoring after stopping AED, monitoring lytes and Hb  Discharge Plan: Anticipate discharge in 24-[facility] hrs [facility]    Code Status: Level 1 - Full Code    Subjective   Pt seen and examined at bedside during AM rounds  No acute overnight events reported  Pt is more awake and responding with yes and nods   No new nursing concerns currently  Tolerating TF    Objective :  Temp:  [97.8 °F (36.6 °C)-98.2 °F (36.8 °C)] 97.8 °F (36.6 °C)  HR:  [76-88] 77  BP: (114-154)/(61-88) 114/75  Resp:  [17] 17  SpO2:  [90 %-96 %] 90 %  O2 Device: None (Room air)    Body mass index is 19.54 kg/m².     Input and Output Summary (last 24 hours):     Intake/Output Summary (Last 24 hours) at 3/9/2025 0735  Last data filed at 3/9/2025 0409  Gross per 24 hour   Intake 505 ml   Output 1150 ml   Net -645 ml       Physical Exam  S1,2 (+) R  Lungs diminished bases, poor effort  Mentation at baseline , speaks only one word and is awake, does follow some commands like opens eyes and moves RUE    Lines/Drains:  Lines/Drains/Airways       Active Status       Name Placement date Placement time Site Days    Gastrostomy/Enterostomy Percutaneous Endoscopic Gastrostomy (PEG) Umbilicus 03/05/25 0059  Umbilicus  4    Urethral Catheter 03/05/25 0059  --  4                  Urinary Catheter:  Goal for removal: N/A - Chronic Lawson           Telemetry:  Telemetry Orders (From admission, onward)               24 Hour Telemetry Monitoring  Continuous x 24 Hours (Telem)        Expiring   Question:  Reason for 24 Hour Telemetry  Answer:  Metabolic/electrolyte disturbance with high probability of dysrhythmia. K level <3 or >6 OR KCL infusion >10mEq/hr                     Telemetry Reviewed: Normal Sinus Rhythm  Indication for Continued Telemetry Use: Metabolic/electrolyte disturbance with high probability of dysrhythmia               Lab Results: I have reviewed the following results:    Results from last 7 days   Lab Units 03/09/25  0527 03/08/25  0802 03/08/25  0627 03/07/25  1910 03/07/25  0912   WBC Thousand/uL 10.81*  --  12.74*  --  15.66*   HEMOGLOBIN g/dL 9.2*   < > 8.6*   < > 10.0*   HEMATOCRIT % 29.0*  --  26.1*  --  30.3*   PLATELETS Thousands/uL 314  --  298  --  316   SEGS PCT %  --   --   --   --  85*   LYMPHO PCT %  --   --  3*  --  3*   MONO PCT %  --   --  2*  --  6   EOS PCT %  --   --  1  --  3    < > = values in this interval not displayed.     Results from last 7 days   Lab Units 03/09/25  0527 03/06/25  1121 03/05/25  0233   SODIUM mmol/L 140   < > 135   POTASSIUM mmol/L 3.2*   < > 4.6   CHLORIDE mmol/L 106   < > 101   CO2 mmol/L 27   < > 28   BUN mg/dL 20   < > 30*   CREATININE mg/dL 0.87   < > 0.94   ANION GAP mmol/L 7   < > 6   CALCIUM mg/dL 8.2*   < > 8.6   ALBUMIN g/dL  --   --  2.9*   TOTAL BILIRUBIN mg/dL  --   --  0.46   ALK PHOS U/L  --   --  63   ALT U/L  --   --  16   AST U/L  --   --  13   GLUCOSE RANDOM mg/dL 165*   < > 153*    < > = values in this interval not displayed.     Results from last 7 days   Lab Units 03/05/25  0610   INR  1.19     Results from last 7 days   Lab Units 03/09/25  0702 03/09/25  0533 03/09/25  0008 03/08/25  1614 03/08/25  1234 03/08/25  0626 03/08/25  0039 03/07/25  1845 03/07/25  1113 03/07/25  0759 03/07/25  0631 03/07/25  0015   POC GLUCOSE mg/dl 138 165* 146* 136 124 145* 150* 128 138 153* 128 95               Recent Cultures (last 7 days):   Results from last 7 days   Lab Units 03/05/25  0458   URINE CULTURE  >100,000 cfu/ml Pseudomonas aeruginosa*  >100,000 cfu/ml Enterococcus  faecalis VRE*       Imaging Results Review: I reviewed radiology reports from this admission including: CT abdomen/pelvis.  Other Study Results Review: No additional pertinent studies reviewed.    Last 24 Hours Medication List:     Current Facility-Administered Medications:     albuterol (PROVENTIL HFA,VENTOLIN HFA) inhaler 2 puff, Q4H PRN    amLODIPine  (NORVASC) tablet 10 mg, Daily    collagenase (SANTYL) ointment, Daily    insulin lispro (HumALOG/ADMELOG) 100 units/mL subcutaneous injection 1-5 Units, Q6H NURIA **AND** Fingerstick Glucose (POCT), Q6H    multi-electrolyte (Plasmalyte-A/Isolyte-S PH 7.4/Normosol-R) IV solution, Continuous, Last Rate: 75 mL/hr (03/08/25 1706)    pantoprazole (PROTONIX) injection 40 mg, Q12H    [COMPLETED] piperacillin-tazobactam (ZOSYN) 4.5 g in sodium chloride 0.9 % 100 mL IV LOADING DOSE, Once, Last Rate: Stopped (03/08/25 2038) **FOLLOWED BY** piperacillin-tazobactam (ZOSYN) 4.5 g in sodium chloride 0.9 % 100 mL IVPB (EXTENDED INFUSION), Q8H, Last Rate: 4.5 g (03/09/25 0409)    potassium chloride 20 mEq IVPB (premix), Once    potassium chloride oral solution 20 mEq, Once    Administrative Statements   Today, Patient Was Seen By: Osman Girard MD  I have spent a total time of 45 minutes in caring for this patient on the day of the visit/encounter including Diagnostic results, Prognosis, Risks and benefits of tx options, Instructions for management, Patient and family education, Importance of tx compliance, Risk factor reductions, Impressions, Counseling / Coordination of care, Documenting in the medical record, Reviewing/placing orders in the medical record (including tests, medications, and/or procedures), Obtaining or reviewing history  , and Communicating with other healthcare professionals .    **Please Note: This note may have been constructed using a voice recognition system.**

## 2025-03-09 NOTE — ASSESSMENT & PLAN NOTE
Was started on 2 AED at [facility]  Plan was to wean off per [facility] but family does not want it given and they understand that it could be fatal but they feel AED is making her not responsive and altered, discussed with neurology here but as family does not want AED, neurology does not have any further recommendations

## 2025-03-10 VITALS
HEART RATE: 72 BPM | OXYGEN SATURATION: 98 % | RESPIRATION RATE: 18 BRPM | WEIGHT: 121.03 LBS | BODY MASS INDEX: 19.45 KG/M2 | HEIGHT: 66 IN | DIASTOLIC BLOOD PRESSURE: 64 MMHG | SYSTOLIC BLOOD PRESSURE: 151 MMHG | TEMPERATURE: 97.5 F

## 2025-03-10 PROBLEM — E87.6 HYPOKALEMIA: Status: RESOLVED | Noted: 2025-03-08 | Resolved: 2025-03-10

## 2025-03-10 LAB
ANION GAP SERPL CALCULATED.3IONS-SCNC: 7 MMOL/L (ref 4–13)
BUN SERPL-MCNC: 22 MG/DL (ref 5–25)
CALCIUM SERPL-MCNC: 8.3 MG/DL (ref 8.4–10.2)
CHLORIDE SERPL-SCNC: 107 MMOL/L (ref 96–108)
CO2 SERPL-SCNC: 25 MMOL/L (ref 21–32)
CREAT SERPL-MCNC: 0.78 MG/DL (ref 0.6–1.3)
ERYTHROCYTE [DISTWIDTH] IN BLOOD BY AUTOMATED COUNT: 16 % (ref 11.6–15.1)
GFR SERPL CREATININE-BSD FRML MDRD: 66 ML/MIN/1.73SQ M
GLUCOSE SERPL-MCNC: 145 MG/DL (ref 65–140)
GLUCOSE SERPL-MCNC: 148 MG/DL (ref 65–140)
GLUCOSE SERPL-MCNC: 165 MG/DL (ref 65–140)
GLUCOSE SERPL-MCNC: 171 MG/DL (ref 65–140)
HCT VFR BLD AUTO: 29.8 % (ref 34.8–46.1)
HGB BLD-MCNC: 9.6 G/DL (ref 11.5–15.4)
MAGNESIUM SERPL-MCNC: 2 MG/DL (ref 1.9–2.7)
MCH RBC QN AUTO: 31.3 PG (ref 26.8–34.3)
MCHC RBC AUTO-ENTMCNC: 32.2 G/DL (ref 31.4–37.4)
MCV RBC AUTO: 97 FL (ref 82–98)
NRBC BLD AUTO-RTO: 0 /100 WBCS
PLATELET # BLD AUTO: 316 THOUSANDS/UL (ref 149–390)
PMV BLD AUTO: 9.8 FL (ref 8.9–12.7)
POTASSIUM SERPL-SCNC: 3.6 MMOL/L (ref 3.5–5.3)
RBC # BLD AUTO: 3.07 MILLION/UL (ref 3.81–5.12)
SODIUM SERPL-SCNC: 139 MMOL/L (ref 135–147)
WBC # BLD AUTO: 13.84 THOUSAND/UL (ref 4.31–10.16)

## 2025-03-10 PROCEDURE — 92526 ORAL FUNCTION THERAPY: CPT

## 2025-03-10 PROCEDURE — 83735 ASSAY OF MAGNESIUM: CPT | Performed by: FAMILY MEDICINE

## 2025-03-10 PROCEDURE — 97598 DBRDMT OPN WND ADDL 20CM/<: CPT | Performed by: PHYSICIAN ASSISTANT

## 2025-03-10 PROCEDURE — 80048 BASIC METABOLIC PNL TOTAL CA: CPT | Performed by: FAMILY MEDICINE

## 2025-03-10 PROCEDURE — 99239 HOSP IP/OBS DSCHRG MGMT >30: CPT | Performed by: FAMILY MEDICINE

## 2025-03-10 PROCEDURE — 82948 REAGENT STRIP/BLOOD GLUCOSE: CPT

## 2025-03-10 PROCEDURE — 97597 DBRDMT OPN WND 1ST 20 CM/<: CPT | Performed by: PHYSICIAN ASSISTANT

## 2025-03-10 PROCEDURE — 85027 COMPLETE CBC AUTOMATED: CPT | Performed by: FAMILY MEDICINE

## 2025-03-10 RX ORDER — PANTOPRAZOLE SODIUM 40 MG/1
40 TABLET, DELAYED RELEASE ORAL DAILY
Qty: 30 TABLET | Refills: 0 | Status: SHIPPED | OUTPATIENT
Start: 2025-03-10

## 2025-03-10 RX ORDER — AMLODIPINE BESYLATE 10 MG/1
10 TABLET ORAL DAILY
Qty: 30 TABLET | Refills: 0 | Status: SHIPPED | OUTPATIENT
Start: 2025-03-11

## 2025-03-10 RX ORDER — ALBUTEROL SULFATE 90 UG/1
2 INHALANT RESPIRATORY (INHALATION) EVERY 4 HOURS PRN
Qty: 6.7 G | Refills: 0 | Status: SHIPPED | OUTPATIENT
Start: 2025-03-10

## 2025-03-10 RX ADMIN — PANTOPRAZOLE SODIUM 40 MG: 40 INJECTION, POWDER, FOR SOLUTION INTRAVENOUS at 00:01

## 2025-03-10 RX ADMIN — SODIUM CHLORIDE, SODIUM GLUCONATE, SODIUM ACETATE, POTASSIUM CHLORIDE, MAGNESIUM CHLORIDE, SODIUM PHOSPHATE, DIBASIC, AND POTASSIUM PHOSPHATE 75 ML/HR: .53; .5; .37; .037; .03; .012; .00082 INJECTION, SOLUTION INTRAVENOUS at 08:39

## 2025-03-10 RX ADMIN — COLLAGENASE SANTYL: 250 OINTMENT TOPICAL at 08:38

## 2025-03-10 RX ADMIN — PIPERACILLIN AND TAZOBACTAM 4.5 G: 36; 4.5 INJECTION, POWDER, FOR SOLUTION INTRAVENOUS at 02:37

## 2025-03-10 RX ADMIN — PIPERACILLIN AND TAZOBACTAM 4.5 G: 36; 4.5 INJECTION, POWDER, FOR SOLUTION INTRAVENOUS at 11:43

## 2025-03-10 RX ADMIN — AMLODIPINE BESYLATE 10 MG: 10 TABLET ORAL at 08:38

## 2025-03-10 RX ADMIN — PANTOPRAZOLE SODIUM 40 MG: 40 INJECTION, POWDER, FOR SOLUTION INTRAVENOUS at 12:58

## 2025-03-10 RX ADMIN — INSULIN LISPRO 1 UNITS: 100 INJECTION, SOLUTION INTRAVENOUS; SUBCUTANEOUS at 06:03

## 2025-03-10 NOTE — ASSESSMENT & PLAN NOTE
Per daughter-in-law patient has developed sacral ulcer after being diagnosed with CVA.  Patient has had debridement done recently and multiple treatment changes for the care of the wound.  CT abdomen pelvis-3/4/2025-No active GI bleed is identified. Sacral decubitus ulcer on the left with associated abscess in the subcutaneous soft tissues measuring 6 x 6.8 x 1.8 cm. Patulous partially fluid-filled esophagus with wall thickening. Correlate for esophagitis. GI does not plan EGD as per familys wishes. Small bilateral pleural effusions with basilar atelectasis.   Switch IV cefepime/vanco/flagyl to zosyn.  Plan to continue zosyn while here and transition to augmentin x 7 days   Wound care consulted.  IR consult appreciated: no indication for drain as per recs  General surgery consulted and debridement done at beside. Appreciate ongoing recommendations

## 2025-03-10 NOTE — NUTRITION
03/10/25 7631   Recommendations/Interventions   Summary EN on hold this am for SLP re-evaluation. SLP is recommending to continue NPO status secondary to high aspiration risk. Continuous EN via PEG to provide 100% of nutritional needs. No updated weights.   Interventions/Recommendations Tube feeding recs provided;Lab consider order (Specify)   Recommendations to Provider Suggest restart continuous EN of Jevity 1.2 kcal @ 50 ml/hr with 1 packet prosource no carb per day and 150 ml free water flush every 6 hours. Monitor electrolytes and weight.

## 2025-03-10 NOTE — ASSESSMENT & PLAN NOTE
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  Daughter-in-law says patient has been doing physical therapy in which she has improved with movement. Patient is currently bedbound, with flaccid left arm. Patient also has expressive aphasia, daughter-in-law at 1 point patient was nonverbal but is now able to say a few words here and there.  Was started on AED keppra and trileptal at [facility] and since then it is being gradually weaned.  Pts DIL/family have refused the medication administration and has not received   Turn and reposition.  Specialty bed ordered.  PT and OT evaluation and treat.

## 2025-03-10 NOTE — ASSESSMENT & PLAN NOTE
Was started on 2 AED at [facility]  Plan was to wean off per [facility] but family does not want it given and they understand that it could be fatal but they feel AED is making her not responsive and altered, discussed with neurology here but as family does not want AED, neurology does not have any further recommendations

## 2025-03-10 NOTE — CASE MANAGEMENT
Case Management Discharge Planning Note    Patient name Hermila Montes  Location [address] [address]/2 E 261-01 MRN [id number]  : 1933 Date 3/10/2025       Current Admission Date: 3/4/2025  Current Admission Diagnosis:Upper GI bleed   Patient Active Problem List    Diagnosis Date Noted Date Diagnosed    Complicated UTI (urinary tract infection) 2025     History of epilepsia partialis continua (HCC) 2025     Encephalopathy acute 2025     Palliative care patient 2025     Sacral ulcer (HCC) 2025     Upper GI bleed 2025     CVA (cerebral vascular accident) (HCC) 2025     Oropharyngeal dysphagia 2025     Severe protein-calorie malnutrition (HCC) 2025     ACUTE POSTHEMORRHAGIC ANEMIA 2025       LOS (days): 6  Geometric Mean LOS (GMLOS) (days): 5.8  Days to GMLOS:0.2     OBJECTIVE:  Risk of Unplanned Readmission Score: 10.74         Current admission status: Inpatient   Preferred Pharmacy:   [facility] - [address] - [address]  [place] [address]  Phone: [phone] Fax: [phone]    Primary Care Provider: Everardo Leach MD    Primary Insurance: MEDICARE  Secondary Insurance:     DISCHARGE DETAILS:    Other Referral/Resources/Interventions Provided:  Interventions: Transportation  Referral Comments: CM received confirmation from [facility] and Round Trip that [facility] claimed the ride for 4:00 PM. CM informed all parties of the same.    Treatment Team Recommendation: SNF  Discharge Destination Plan:: SNF  Transport at Discharge : Stretcher van     Number/Name of Dispatcher: Round Trip  Transported by (Company and Unit #): [employer]  ETA of Transport (Date): 03/10/25  ETA of Transport (Time): 1600    Accepting Facility Name, City & State : [address] [address]  Receiving Facility/Agency Phone Number: [phone]  Facility/Agency Fax Number: [phone]

## 2025-03-10 NOTE — ASSESSMENT & PLAN NOTE
Patient has history of dysphagia. Per daughter-in-law patient was seen by speech and [facility] and was cleared for diet of purée with nectar thick liquids.  Daughter-in-law states during the days they were feeding patient food and holding tube feed and at night they were giving tube feed for supplementation.  Speech eval done: unable to tolerate PO  Had been initiated on tube feeds after confirmation of PEG tube placement; tube feeds are now on hold

## 2025-03-10 NOTE — CASE MANAGEMENT
Case Management Discharge Planning Note    Patient name Hermila Montes  Location [address] [address]/2 [address] 261-01 MRN [id number]  : 1933 Date 3/10/2025       Current Admission Date: 3/4/2025  Current Admission Diagnosis:Upper GI bleed   Patient Active Problem List    Diagnosis Date Noted Date Diagnosed    Complicated UTI (urinary tract infection) 2025     History of epilepsia partialis continua (HCC) 2025     Encephalopathy acute 2025     Palliative care patient 2025     Sacral ulcer (HCC) 2025     Upper GI bleed 2025     CVA (cerebral vascular accident) (HCC) 2025     Oropharyngeal dysphagia 2025     Severe protein-calorie malnutrition (HCC) 2025     ACUTE POSTHEMORRHAGIC ANEMIA 2025       LOS (days): 6  Geometric Mean LOS (GMLOS) (days): 5.8  Days to GMLOS:0.2     OBJECTIVE:  Risk of Unplanned Readmission Score: 10.74         Current admission status: Inpatient   Preferred Pharmacy:   [facility] - [address] - [address]  [address]  [address]  [place] [address]  Phone: [phone] Fax: [phone]    Primary Care Provider: Everardo Leach MD    Primary Insurance: MEDICARE  Secondary Insurance:     DISCHARGE DETAILS:    Discharge planning discussed with:: Patient's DIL  Freedom of Choice: Yes  Comments - Freedom of Choice: CM received a call back from patient's DIL. She reported that she is also in agreement with the plan and denied any questions or concerns at his time. Patient's DIL reported that [facility] [place]. CM to arrange transport for later this afternoon.  CM contacted family/caregiver?: Yes  Were Treatment Team discharge recommendations reviewed with patient/caregiver?: Yes  Did patient/caregiver verbalize understanding of patient care needs?: N/A- going to facility  Were patient/caregiver advised of the risks associated with not following Treatment Team discharge  recommendations?: Yes    Contacts  Patient Contacts: Radha  Relationship to Patient:: Family  Contact Method: Phone  Phone Number: [phone]  Reason/Outcome: Continuity of Care, Discharge Planning    Requested Home Health Care         Is the patient interested in HHC at discharge?: No    DME Referral Provided  Referral made for DME?: No    Other Referral/Resources/Interventions Provided:  Interventions: SNF, Transportation  Referral Comments: CM received a message from Leelee with [facility]. She confirmed that patient is able to return there today. CM submitted a round trip referral for [facility] transport for 2:45 PM or later today. Round trip to schedule and contact this CM with a confirmed transportation time.    Would you like to participate in our [facility] service program?  : No - Declined    Treatment Team Recommendation: SNF  Discharge Destination Plan:: SNF  Transport at Discharge : [facility]     Number/Name of Dispatcher: Round Trip  Transported by (Company and Unit #): TBD  ETA of Transport (Date): 03/10/25  ETA of Transport (Time): 1445    Accepting Facility Name, City & State : [facility] - [address] [place] [address]  Receiving Facility/Agency Phone Number: [phone]  Facility/Agency Fax Number: [phone]

## 2025-03-10 NOTE — CASE MANAGEMENT
Case Management Discharge Planning Note    Patient name Hermila Montes  Location [address] [address]/2 [address]01 MRN [id number]  : 1933 Date 3/10/2025       Current Admission Date: 3/4/2025  Current Admission Diagnosis:Upper GI bleed   Patient Active Problem List    Diagnosis Date Noted Date Diagnosed    Complicated UTI (urinary tract infection) 2025     History of epilepsia partialis continua (HCC) 2025     Encephalopathy acute 2025     Palliative care patient 2025     Sacral ulcer (HCC) 2025     Upper GI bleed 2025     CVA (cerebral vascular accident) ([facility]) 2025     Oropharyngeal dysphagia 2025     Severe protein-calorie malnutrition (HCC) 2025     ACUTE POSTHEMORRHAGIC ANEMIA 2025       LOS (days): 6  Geometric Mean LOS (GMLOS) (days): 5.8  Days to GMLOS:0.2     OBJECTIVE:  Risk of Unplanned Readmission Score: 10.74         Current admission status: Inpatient   Preferred Pharmacy:   [facility] - [address] - [address]  [address]  [address]  [place] [address]  Phone: [phone] Fax: [phone]    Primary Care Provider: Everardo Leach MD    Primary Insurance: MEDICARE  Secondary Insurance:     DISCHARGE DETAILS:    Discharge planning discussed with:: Patient's Son  Freedom of Choice: Yes  Comments - Freedom of Choice: CM called patient's son to inform him that patient is cleared for discharge today. Message sent to [facility] to confirm that they can accept her back today. Patient's son appreciated the update and denied any questions or concerns at this time. CM to call back once a confirmed transport time is available. CM left a message for patient's DIL Radha informing her of the same.  CM contacted family/caregiver?: Yes  Were Treatment Team discharge recommendations reviewed with patient/caregiver?: Yes  Did patient/caregiver verbalize understanding of patient care needs?: N/A- going to facility  Were patient/caregiver  advised of the risks associated with not following Treatment Team discharge recommendations?: Yes    Contacts  Patient Contacts: Ross  Relationship to Patient:: Family  Contact Method: Phone  Phone Number: [phone]  Reason/Outcome: Continuity of Care, Discharge Planning    Requested Home Health Care         Is the patient interested in HHC at discharge?: No    DME Referral Provided  Referral made for DME?: No    Other Referral/Resources/Interventions Provided:  Interventions: SNF  Referral Comments: YUN called [facility] at [phone] and spoke to Blake. Blake reported that CM will need to speak to Leelee regarding patient's return, but she is unavailable at this time. CM to call back later. Message also sent in [place].    Would you like to participate in our [facility] service program?  : No - Declined    Treatment Team Recommendation: SNF  Discharge Destination Plan:: SNF  [facility]

## 2025-03-10 NOTE — PROCEDURES
Bedside Excisional debridement - General Surgery   Hermila Montes 89 y.o. female MRN: [id number]  Unit/Bed#: 2 E 261-01 Encounter: [id number]      Assessment:   Full thickness stage 4 sacral pressure ulcer      Procedure  A time-out was performed prior to the initiation of the bedside debridement, and patient agreeable to procedure.  Patient positioning: left lateral decubitus  Local anesthetic used (type and volume): none  Adequate analgesia in the area to be debrided was verified.     Technique used: sharp debridement  Instruments used: 10 blade, metzenbalm scissors  Description of tissue removed: necrotic slough   Appearance and size of wound: Wound is oval in shape, full-thickness probes to bone, approx. 30% pink granular tissue and 70% yellow adhered stringy slough, measures approximatety 12cm x 12cm x 3cm  Depth of the debridement (depth, layer exposed, appearance of underlying tissue): Muscle and sacral fascia exposed  Dressing used (packing and external dressing): santyl, 4x4, foam pad    Patient tolerated the procedure well, and adequate hemostasis was achieved at the completion of the procedure.   Upon completion of the bedside procedure, the patient was hemodynamically stable, and vitals were WNL.         Manuela Palacio PA-C  3/10/2025

## 2025-03-10 NOTE — ASSESSMENT & PLAN NOTE
Pts POA does not want any procedures with anesthesia and as per the discussion with GI they dont want EGD  Hb dropping causing ABLA, 1 U ordered , verbal consent from DIL/POA obtainted. Hb s/p prbc was 9.2, stable at 9.6 today  Cont IV protonix  Daily H&H checks now on considering stability of Hb

## 2025-03-10 NOTE — ASSESSMENT & PLAN NOTE
Gradually getting closer to baseline  Metabolic from sacral infection vs toxic from antiseizure meds vs worsening baseline cognition s/p CVA and recent COVID vs delirum  Poor prognosis  Palliative consulted: signed off

## 2025-03-10 NOTE — SPEECH THERAPY NOTE
Speech Language/Pathology     Speech/Language Pathology Progress Note     Patient Name: Hermila Montes    Today's Date: 3/10/2025     Problem List  Principal Problem:    Upper GI bleed  Active Problems:    Sacral ulcer (HCC)    CVA (cerebral vascular accident) (HCC)    Oropharyngeal dysphagia    Severe protein-calorie malnutrition (HCC)    ACUTE POSTHEMORRHAGIC ANEMIA    Encephalopathy acute    Palliative care patient    Complicated UTI (urinary tract infection)    History of epilepsia partialis continua (HCC)    Hypokalemia     Subjective:  Patient received w/ eyes closed; wakes w/ voice and light touch     Previous/current diet: NPO w/ PEG    Objective:  Pt orally defensive initially.  Noted audible congestion w/ gurgled vocal quality - suggestive of poor secretion mgmt.  Unable to follow cues for volitional swallow to clear.  Oral suctioning rendered w/ Yankauer (as tolerated by pt) - thick copious secretions retrieved. Pt then accepts trace quantities of puree solid.  Noted delayed swallow per laryngeal palpation, audible gulp.  Reduced hyolaryngeal excursion.  Increased oral processing time/reduced bolus reception noted w/ progression of offerings.  Vocal quality becomes progressively wet and gurgled.  Pt becomes increasingly agitated and begins to clear throat/expectorate.  Further offerings d/c for pt safety. Suctioning attempted, pt orally defensive and refuses.         Assessment:  Known oropharyngeal dysphagia with high risk of aspiration per VFSS completed at [facility] January 2025.  Bedside presentation appears c/w objective measures.  Pt demonstrates poor secretion management, s/s suggestive of aspiration of same as well as trace quantities of puree solids.  Liquid trials deferred 2/2 pt refusal and safety. Patient noted to become increasingly uncomfortable and agitated w/ PO trials; suspect this is related to discomfort associated with PO intake/aspiration.  Additionally, with consideration of cognitive  status/disorientation and inability to follow commands; it is unlikely pt able to meet choloic needs by mouth.  High risk of aspiration w/ initiation of PO intake w/o aggressive therapy.      Least amount of airway invasion identieid on the following (VFSS 1/21/25):  [x]Moderately Thick liquids -5 mL: 3; Material enters the airway, remains above the vocal folds, and is not ejected from the airway [x]Puree: 3; Material enters the airway, remains above the vocal folds, and is not ejected from the airway vs deep- PAS 5 trace      Plan:  NPO w/ PEG   Frequent oral care   Given code status, will recommend ST follow-up at next level of care for therapeutic PO trials, assessment/safety of pleasure feeds with progress in therapy.    ST monitoring as indicated while in the acute care setting- pt status remains unchanged, likely at baseline.      Jeane Lopez MS, CCC-SLP  Speech-Language Pathologist  PA #[id number]  [place] #[id number]

## 2025-03-10 NOTE — DISCHARGE SUMMARY
Discharge Summary - Hospitalist   Name: Hermila Montes 89 y.o. female I MRN: [id number]  Unit/Bed#: 2 E 261-01 I Date of Admission: 3/4/2025   Date of Service: 3/10/2025 I Hospital Day: 6     Assessment & Plan  Upper GI bleed  Pts POA does not want any procedures with anesthesia and as per the discussion with GI they dont want EGD  Hb dropping causing ABLA, 1 U ordered , verbal consent from DIL/POA obtainted. Hb s/p prbc was 9.2, stable at 9.6 today  Cont IV protonix  Daily H&H checks now on considering stability of Hb   Encephalopathy acute  Gradually getting closer to baseline  Metabolic from sacral infection vs toxic from antiseizure meds vs worsening baseline cognition s/p CVA and recent COVID vs delirum  Poor prognosis  Palliative consulted: signed off  Oropharyngeal dysphagia  Patient has history of dysphagia. Per daughter-in-law patient was seen by speech and [facility] and was cleared for diet of purée with nectar thick liquids.  Daughter-in-law states during the days they were feeding patient food and holding tube feed and at night they were giving tube feed for supplementation.  Speech eval done: unable to tolerate PO  Had been initiated on tube feeds after confirmation of PEG tube placement; tube feeds are now on hold  CVA (cerebral vascular accident) (HCC)  Patient developed a hemorrhagic CVA in January 2025.  Daughter-in-law says patient has been doing physical therapy in which she has improved with movement. Patient is currently bedbound, with flaccid left arm. Patient also has expressive aphasia, daughter-in-law at 1 point patient was nonverbal but is now able to say a few words here and there.  Was started on AED keppra and trileptal at [facility] and since then it is being gradually weaned.  Pts DIL/family have refused the medication administration and has not received   Turn and reposition.  Specialty bed ordered.  PT and OT evaluation and treat.  Sacral ulcer (HCC)  Per daughter-in-law patient has developed  sacral ulcer after being diagnosed with CVA.  Patient has had debridement done recently and multiple treatment changes for the care of the wound.  CT abdomen pelvis-3/4/2025-No active GI bleed is identified. Sacral decubitus ulcer on the left with associated abscess in the subcutaneous soft tissues measuring 6 x 6.8 x 1.8 cm. Patulous partially fluid-filled esophagus with wall thickening. Correlate for esophagitis. GI does not plan EGD as per familys wishes. Small bilateral pleural effusions with basilar atelectasis.   Switch IV cefepime/vanco/flagyl to zosyn.  Plan to continue zosyn while here and transition to augmentin x 7 days   Wound care consulted.  IR consult appreciated: no indication for drain as per recs  General surgery consulted and debridement done at beside. Appreciate ongoing recommendations  Severe protein-calorie malnutrition (HCC)  Malnutrition Findings:   Adult Malnutrition type: Chronic illness  Adult Degree of Malnutrition: Other severe protein calorie malnutrition  Malnutrition Characteristics: Muscle loss, Weight loss              360 Statement: Malnutrition related to chronic medical conditions as evidenced by 6% weight loss in 1 month and severe muscle wasting (clavicles, temples). Treated with Enteral nutrition via PEG tube (pending EN initiation)    BMI Findings:        Body mass index is 19.54 kg/m².     Starting tube feeds, now at goal of 50cc/hr, tolerating well  Severe protein-calorie malnutrition POA duet to increased nutritional needs in the setting of acute illness  and wound healing a/e/b 6% weight loss in 1 month and severe muscle wasting (clavicles, temples) treated with Enteral nutrition and ongoing registered nutritionist evaluation for optimal protein/calorie intake.      ACUTE POSTHEMORRHAGIC ANEMIA  1 U PRBC ordered, hb 9.2 now  H&H Q12H  Verbal consent from DIL obtained      Sacral pressure ulcer, stage 4 POA due to decreased mobility in the setting of recent stroke a/e/b  left sacral wound measuring 6x6.8x1.8cm with abscess treated with wound care consultation, IR consultation, Q2 hour turn and reposition, offload pressure, evaluate for pressure reduction mattress, registered dietitian evaluation to optimize protein/calorie intake for wound healing.   Palliative care patient  [facility] had with family, continue full code  Complicated UTI (urinary tract infection)  Lawson present  E fecalis VRE and pseudomonas (+) in urine   Considering pts improvement with these antibiotics, this likely represents colonization   Initially on cefepime and vancomycin, now on zosyn as of 3/8  History of epilepsia partialis continua (HCC)  Was started on 2 AED at [facility]  Plan was to wean off per [facility] physician but family does not want it given and they understand that it could be fatal but they feel AED is making her not responsive and altered, discussed with neurology here but as family does not want AED, neurology does not have any further recommendations  Hypokalemia (Resolved: 3/10/2025)  Resolved     Medical Problems       Resolved Problems  Date Reviewed: 3/9/2025          Resolved    Hypokalemia 3/10/2025     Resolved by  EVELINE Duncan        Discharging Physician / Practitioner: Osman Girard MD  PCP: Everardo Leach MD  Admission Date:   Admission Orders (From admission, onward)       Ordered        03/04/25 2344  INPATIENT ADMISSION  Once                          Discharge Date: 03/10/25    Consultations During Hospital Stay:  IP CONSULT TO GASTROENTEROLOGY  IP CONSULT TO NUTRITION SERVICES  IP CONSULT TO PHARMACY  INPATIENT CONSULT TO IR  IP CONSULT TO ACUTE CARE SURGERY  IP CONSULT TO PALLIATIVE CARE  IP CONSULT TO PHARMACY      Procedures Performed:   Debridement of the sacral wound by general surgery 3/5/25    Significant Findings / Test Results:   As above    Incidental Findings:   none    Test Results Pending at Discharge (will require follow up):   none     Outpatient Tests  "Requested:  none    Complications:  none    Reason for Admission: \"Hermila Montes is a 89 y.o. female with a PMH of CVA, sacral ulcer, oropharyngeal dysphagia, bedbound, pneumonia, respiratory failure, chronic Lawson and PEG tube who presented to the [facility] ED on 3/4/2025 from [facility] due to complaints of coffee-ground emesis x 1 during the a.m. patient also had a large sacral ulcer with temp of 100.2 F at the nursing home.  Patient currently has a PEG tube that was placed due to patient's recent CVA about 9 weeks ago and patient during that time having difficulty swallowing and development of aspiration pneumonia.  Daughter-in-law states patient passed speech evaluation and is currently on nectar thick liquids and puréed diet and patient has been fed by mouth during the day and at bedtime tube feed is set up for infusion. Patient also has a Lawson that was placed due to sacral ulcer per Radha, daughter-in-law.  Patient's sacral wound was debrided recently and prior treatment was changed to Santyl. Patient limitation to mobility and expressive speech. CT abdomen did not reveal acute bleeding, but it revealed a sacral ulcer with 6 cm abscess noted with a fluid-filled esophagus. Stool occult negative.  At [facility] patient was kept n.p.o., Protonix was given, GI was consulted, hemoglobin was checked, blood cultures collected and wound cultures collected and patient was started on vancomycin, cefepime and Flagyl antibiotics. Patient's daughter-in-law expressed not wanting patient to be sedated if possible. \"    Hospital Course:   Hermila Montes is a 89 y.o. female patient who originally presented to the hospital on 3/4/2025 due to concern for upper GI bleed as she had coffee-[facility] em[facility].  Patient did not want EGD so gastroenterology signed off.  Patient's H&H was closely monitored and patient received 1 unit PRBC and since then her H&H has stabilized and she has not " had a repeat episode of coffee-ground emesis.  Patient was continued on PPIs IV and transition to p.o. at discharge.  Patient's initial encephalopathy has now resolved.  Likely secondary antiepileptic agents versus sacral ulcer infection.  Patient's family did not want to continue seizure medications and it was discontinued after discussing completed risk profile of stopping this medication significantly which includes but not exclusive to death.  They verbalized understanding of the adverse effects and still wanted to discontinue the antiseizure medications.  Patient had a recent CVA and a repeat CT was done which did not show any worsening with residual hemorrhage was seen.  Patient's neurological status is back to baseline now.  Her baseline is that she is unable to communicate effectively and grunts yes and shakes head for yes or no.  She follows simple commands like closing eyes and raising right hand.  She is unable to move her lower extremities much.  Her urine was positive for UTI and grew VRE E faecalis which likely is contaminant.  She also grew Pseudomonas for which initially she was on cefepime and vancomycin (considering sacral ulcer) which was then switched to Zosyn considering she also had infected sacral ulcer.  She has been transitioned to p.o. Augmentin at discharge for 7 days.  She was seen by speech evaluation and she continued to fail speech evaluation remains high risk for aspiration.  Our recommendation is to keep her n.p.o. and continue tube feeds at 50 cc/h of Jevity 1.2 with 50 mL of every 6 hours.  Considering patient is back at baseline she is cleared for discharge to [facility] nursing facility today in hemodynamically stable condition overall prognosis remains poor.  Multiple goals of care discussions have been had with the patient's son and daughter-in-law Radha.  They want her to be full code.  Cleared for discharge    Please see above list of diagnoses and related plan  "for additional information.     Condition at Discharge: fair    Discharge Day Visit / Exam:   Subjective:  does not converse,mainly non verbal  Vitals: Blood Pressure: 151/64 (03/10/25 0802)  Pulse: 72 (03/10/25 0802)  Temperature: 97.5 °F (36.4 °C) (03/10/25 0802)  Temp Source: Oral (03/09/25 2318)  Respirations: 18 (03/09/25 2318)  Height: 5' 6\" (167.6 cm) (03/04/25 2344)  Weight - Scale: 54.9 kg (121 lb 0.5 oz) (03/04/25 2344)  SpO2: 98 % (03/10/25 0802)  Physical Exam s1,2 (+) R  Mainly nonverbal.nods yes or no, grunts at times  Abd shows PEG tube non infected and in place  Does not move LE  Moves RUE but not LUE  Follows minimal commands like open and close eyes      Discussion with Family: Updated  (son and daughter in law) via phone.  Detailed discussions have been had with the family   Discussed with janes in detail. She knows pt has not received antiseizure medications for 48 hours as she/family has refused the medications thinking about weaning it off. On further discussion she clarified that she does not want to resume the seizure medications and she understands the risk of abrupt cessation which can be fatal. She confirmed twice to me that she does not want the seizure medications resumed. They understand that pt is high risk of aspiration and we do not recommend PO feeding and only recommend TF at this time.    Discharge instructions/Information to patient and family:   See after visit summary for information provided to patient and family.      Provisions for Follow-Up Care:  See after visit summary for information related to follow-up care and any pertinent home health orders.      Mobility at time of Discharge:   Basic Mobility Inpatient Raw Score: 6  JH-HLM Goal: 2: Bed activities/Dependent transfer  JH-HLM Achieved: 2: Bed activities/Dependent transfer  HLM Goal achieved. Continue to encourage appropriate mobility.     Disposition:   Other Skilled Nursing Facility at " [facility]     Planned Readmission: no    Discharge Medications:  See after visit summary for reconciled discharge medications provided to patient and/or family.      Administrative Statements   Discharge Statement:  I have spent a total time of 76 minutes in caring for this patient on the day of the visit/encounter. >30 minutes of time was spent on: Diagnostic results, Prognosis, Risks and benefits of tx options, Instructions for management, Patient and family education, Importance of tx compliance, Risk factor reductions, Impressions, Counseling / Coordination of care, Documenting in the medical record, Reviewing / ordering tests, medicine, procedures  , and Communicating with other healthcare professionals .    **Please Note: This note may have been constructed using a voice recognition system**

## 2025-03-10 NOTE — PLAN OF CARE
NPO w/ PEG   Frequent oral care   Given code status, will recommend ST follow-up at next level of care for therapeutic PO trials, assessment/safety of pleasure feeds with progress in therapy.    ST monitoring as indicated while in the acute care setting- pt status remains unchanged, likely at baseline.

## 2025-03-10 NOTE — WOUND OSTOMY CARE
Progress Note - Wound   Hermila Montes 89 y.o. female MRN: [id number]  Unit/Bed#: 2 E 261-01 Encounter: [id number]        Assessment:   Patient admitted to [facility] due to upper GI bleed. History of HTN, stroke. Wound care nurse follow-up for sacral and heel wounds. Patient is alert and awake, unable to answer questions or follow commands, is on a P-500 low air loss mattress, is incontinent of bowel, ball catheter in place for urine management, assist of 2 to turn for assessment, wedges in place for repositioning, heels elevated off of mattress, patient is receiving tube feeds via PEG tube, is dependent for care.     1. Pressure injury sacrum, stage 4-POA- Wound is oval in shape, full-thickness probes to bone, approx. 30% pink granular tissue and 70% yellow adhered stringy slough, with moderate amount of serosanguineous drainage noted. Nika-wound is dry, intact, blanchable.     2. Pressure injury right heel, unstageable-POA- Wound is oval in shape, true depth unknown, 100% black and brown intact skin, no open aspects or drainage noted. Nika-wound is dry, intact, blanchable.     3. Pressure injury left heel, unstageable-POA- Wound is oval in shape, true depth unknown, 100% black and brown intact skin, no open aspects or drainage noted. Nika-wound is dry, intact, blanchable.     4. Bilateral elbows and hips- Skin is dry, intact, blanchable.     No induration, fluctuance, odor, warmth, redness, or purulence noted to the above noted wounds. New dressings applied. Patient tolerated well, non-nonverbal signs of pain noted during assessment. Primary nurse aware of plan of care. See flow sheets for more detailed assessment findings. Will follow along.    Skin Care Plan:  1-B/L heels: cleanse gently with soap and water, pat dry gently. Apply silicone foam dressing to wound, arpan with T for treatment, change every other day and PRN if soiled or displaced. Peel back dressing to inspect skin every shift.   2-Sacrum: Cleanse with  NSS, pat dry gently. Apply Santyl to wound bed, cover with dry 4x4 (times 3) and cover with silicone foam dressing (Mepilex). Change daily and PRN if soiled or displaced.   2-Turn/reposition q2h or when medically stable for pressure re-distribution on skin, utilizing ATR turning and repositioning system.   3-Elevate heels to offload pressure, utilize offloading boots  4-Moisturize skin daily with skin nourishing cream  5-Ehob cushion in chair when out of bed.  6-P-500 low air loss mattress.      Wound 03/05/25 Pressure Injury Sacrum (Active)   Wound Image   03/10/25 0855   Wound Description Yellow;Slough;Pink;Probes to bone 03/10/25 0855   Pressure Injury Stage 4 03/10/25 0855   Nika-wound Assessment Dry;Intact 03/10/25 0855   Wound Length (cm) 12 cm 03/10/25 0855   Wound Width (cm) 12.2 cm 03/10/25 0855   Wound Depth (cm) 2.7 cm 03/10/25 0855   Wound Surface Area (cm^2) 146.4 cm^2 03/10/25 0855   Wound Volume (cm^3) 395.28 cm^3 03/10/25 0855   Calculated Wound Volume (cm^3) 395.28 cm^3 03/10/25 0855   Change in Wound Size % -43.41 03/10/25 0855   Number of underminings 1 03/10/25 0855   Undermining 1 3.8 03/10/25 0855   Undermining 1 is depth extending from 10-3 03/10/25 0855   Drainage Amount Moderate 03/10/25 0855   Drainage Description Serosanguineous 03/10/25 0855   Non-staged Wound Description Full thickness 03/10/25 0855   Treatments Cleansed;Irrigation with NSS;Site care 03/10/25 0855   Dressing Enzymatic debrider;Gauze;Foam, Silicon (eg. Allevyn, etc) 03/10/25 0855   Wound packed? Yes 03/10/25 0855   Packing- # removed 3 03/10/25 0855   Packing- # inserted 2 03/10/25 0855   Dressing Changed Changed 03/10/25 0855   Patient Tolerance Tolerated well 03/10/25 0855   Dressing Status Clean;Dry;Intact 03/10/25 0855       Wound 03/05/25 Pressure Injury Heel Right (Active)   Wound Image   03/10/25 0901   Wound Description Dry;Intact;Black;Brown 03/10/25 0901   Pressure Injury Stage U 03/10/25 0901   Nika-wound  Assessment Dry;Intact 03/10/25 0901   Wound Length (cm) 3 cm 03/10/25 0901   Wound Width (cm) 5 cm 03/10/25 0901   Wound Depth (cm) 0 cm 03/10/25 0901   Wound Surface Area (cm^2) 15 cm^2 03/10/25 0901   Wound Volume (cm^3) 0 cm^3 03/10/25 0901   Calculated Wound Volume (cm^3) 0 cm^3 03/10/25 0901   Drainage Amount None 03/10/25 0901   Non-staged Wound Description Not applicable 03/10/25 0901   Treatments Cleansed;Site care 03/10/25 0901   Dressing Foam, Silicon (eg. Allevyn, etc) 03/10/25 0901   Wound packed? No 03/10/25 0901   Dressing Changed Reinforced 03/10/25 0901   Patient Tolerance Tolerated well 03/10/25 0901   Dressing Status Clean;Dry;Intact 03/10/25 0901       Wound 03/05/25 Pressure Injury Heel Left (Active)   Wound Image   03/10/25 0902   Wound Description Dry;Black;Brown 03/10/25 0902   Pressure Injury Stage U 03/10/25 0902   Nika-wound Assessment Dry;Intact 03/10/25 0902   Wound Length (cm) 1.3 cm 03/10/25 0902   Wound Width (cm) 1 cm 03/10/25 0902   Wound Depth (cm) 0 cm 03/10/25 0902   Wound Surface Area (cm^2) 1.3 cm^2 03/10/25 0902   Wound Volume (cm^3) 0 cm^3 03/10/25 0902   Calculated Wound Volume (cm^3) 0 cm^3 03/10/25 0902   Drainage Amount None 03/10/25 0902   Non-staged Wound Description Not applicable 03/10/25 0902   Treatments Cleansed;Site care 03/10/25 0902   Dressing Foam, Silicon (eg. Allevyn, etc) 03/10/25 0902   Wound packed? No 03/10/25 0902   Dressing Changed Reinforced 03/10/25 0902   Patient Tolerance Tolerated well 03/10/25 0902   Dressing Status Clean;Dry;Intact 03/10/25 0902     Contact through [facility] Secure Chat with any questions  Wound Care will continue to follow while inpatient    Migdalia Skorski BSN RN CWON  Wound and Ostomy care

## 2025-03-11 ENCOUNTER — TELEPHONE (OUTPATIENT)
Dept: PALLIATIVE MEDICINE | Facility: CLINIC | Age: OVER 89
End: 2025-03-11

## 2025-03-11 NOTE — TELEPHONE ENCOUNTER
1st attempt  Left a message for patient to call office back to schedule an appointment with Palliative Care.

## 2025-03-13 NOTE — TELEPHONE ENCOUNTER
2nd attempt  Left a message for patient to call office back to schedule an appointment with Palliative Care.